# Patient Record
Sex: FEMALE | Race: WHITE | Employment: FULL TIME | URBAN - METROPOLITAN AREA
[De-identification: names, ages, dates, MRNs, and addresses within clinical notes are randomized per-mention and may not be internally consistent; named-entity substitution may affect disease eponyms.]

---

## 2017-01-31 ENCOUNTER — OFFICE VISIT (OUTPATIENT)
Dept: FAMILY MEDICINE CLINIC | Facility: CLINIC | Age: 42
End: 2017-01-31

## 2017-01-31 VITALS
RESPIRATION RATE: 16 BRPM | WEIGHT: 104 LBS | DIASTOLIC BLOOD PRESSURE: 68 MMHG | TEMPERATURE: 98 F | HEART RATE: 72 BPM | BODY MASS INDEX: 18 KG/M2 | OXYGEN SATURATION: 98 % | SYSTOLIC BLOOD PRESSURE: 110 MMHG

## 2017-01-31 DIAGNOSIS — Z72.0 TOBACCO USE: ICD-10-CM

## 2017-01-31 DIAGNOSIS — J20.9 ACUTE BRONCHITIS, UNSPECIFIED ORGANISM: Primary | ICD-10-CM

## 2017-01-31 DIAGNOSIS — Z20.89 EXPOSURE TO PNEUMONIA: ICD-10-CM

## 2017-01-31 PROCEDURE — 99202 OFFICE O/P NEW SF 15 MIN: CPT | Performed by: NURSE PRACTITIONER

## 2017-01-31 RX ORDER — AZITHROMYCIN 250 MG/1
TABLET, FILM COATED ORAL
Qty: 6 TABLET | Refills: 0 | Status: SHIPPED | OUTPATIENT
Start: 2017-01-31 | End: 2017-02-22 | Stop reason: ALTCHOICE

## 2017-01-31 RX ORDER — CODEINE PHOSPHATE AND GUAIFENESIN 10; 100 MG/5ML; MG/5ML
SOLUTION ORAL
Qty: 100 ML | Refills: 0 | Status: SHIPPED | OUTPATIENT
Start: 2017-01-31 | End: 2017-02-22 | Stop reason: ALTCHOICE

## 2017-01-31 NOTE — PATIENT INSTRUCTIONS
Bronchitis, Antibiotic Treatment (Adult)    Bronchitis is an infection of the air passages (bronchial tubes) in your lungs. It often occurs when you have a cold.  This illness is contagious during the first few days and is spread through the air by coughi · Finish all antibiotic medicine. Do this even if you are feeling better after only a few days. Follow-up care  Follow up with your healthcare provider, or as advised. If you had an X-ray or ECG (electrocardiogram), a specialist will review it.  You will b

## 2017-01-31 NOTE — PROGRESS NOTES
CHIEF COMPLAINT:   Patient presents with:  URI: Symptoms over 1 week      HPI:   Shanna Francis is a 38year old female who presents for upper respiratory symptoms for nearly 10 days now (over 1 week).  Patient reports productive cough, chest congestion, nasal FRAGMENTING OF KIDNEY STONE Right 2/26/2016    Comment Procedure: LITHOTRIPSY WITH CYSTOSCOPY, STENT PLACEMENT;  Surgeon: Brad James MD;  Location: [address] [place] [address]    CYSTOURETHROSCOPY Right 2/26/2016    Comment Procedure: LITHOTRIPSY WITH CYST ASSESSMENT: Acute bronchitis, unspecified organism  (primary encounter diagnosis)  Tobacco use  Exposure to pneumonia    PLAN:   - Meds as below. - Advised smoking cessation.  - Comfort care as described in Patient Instructions.   - Advised F/U visit if · You may use over-the-counter medicines to control fever or pain, unless another medicine was prescribed.  (Note: If you have chronic liver or kidney disease or have ever had a stomach ulcer or gastrointestinal bleeding, talk with your healthcare provider · Worsening weakness, drowsiness, headache, or stiff neck  · Trouble breathing, wheezing, or pain with breathing  Date Last Reviewed: 9/13/2015  © 4058-2235 The [address] [place] [address], [address] [place] [address]. All rights reserved.  This inf

## 2017-02-20 PROCEDURE — 82507 ASSAY OF CITRATE: CPT | Performed by: PHYSICIAN ASSISTANT

## 2017-02-20 PROCEDURE — 82436 ASSAY OF URINE CHLORIDE: CPT | Performed by: PHYSICIAN ASSISTANT

## 2017-02-20 PROCEDURE — 82340 ASSAY OF CALCIUM IN URINE: CPT | Performed by: PHYSICIAN ASSISTANT

## 2017-02-20 PROCEDURE — 84392 ASSAY OF URINE SULFATE: CPT | Performed by: PHYSICIAN ASSISTANT

## 2017-02-20 PROCEDURE — 83945 ASSAY OF OXALATE: CPT | Performed by: PHYSICIAN ASSISTANT

## 2017-02-22 PROBLEM — F41.9 ANXIETY AND DEPRESSION: Status: ACTIVE | Noted: 2017-02-22

## 2017-02-22 PROBLEM — F17.200 SMOKER: Status: ACTIVE | Noted: 2017-02-22

## 2017-02-22 PROBLEM — F32.A ANXIETY AND DEPRESSION: Status: ACTIVE | Noted: 2017-02-22

## 2017-02-22 PROCEDURE — 81003 URINALYSIS AUTO W/O SCOPE: CPT | Performed by: INTERNAL MEDICINE

## 2018-02-06 ENCOUNTER — NURSE ONLY (OUTPATIENT)
Dept: FAMILY MEDICINE CLINIC | Facility: CLINIC | Age: 43
End: 2018-02-06

## 2018-02-06 VITALS
OXYGEN SATURATION: 97 % | RESPIRATION RATE: 20 BRPM | TEMPERATURE: 98 F | HEART RATE: 80 BPM | DIASTOLIC BLOOD PRESSURE: 60 MMHG | SYSTOLIC BLOOD PRESSURE: 98 MMHG | BODY MASS INDEX: 18.07 KG/M2 | HEIGHT: 63 IN | WEIGHT: 102 LBS

## 2018-02-06 DIAGNOSIS — J02.9 PHARYNGITIS, UNSPECIFIED ETIOLOGY: Primary | ICD-10-CM

## 2018-02-06 DIAGNOSIS — J40 BRONCHITIS: ICD-10-CM

## 2018-02-06 LAB
CONTROL LINE PRESENT WITH A CLEAR BACKGROUND (YES/NO): YES YES/NO
STREP GRP A CUL-SCR: NEGATIVE

## 2018-02-06 PROCEDURE — 87880 STREP A ASSAY W/OPTIC: CPT | Performed by: NURSE PRACTITIONER

## 2018-02-06 PROCEDURE — 99213 OFFICE O/P EST LOW 20 MIN: CPT | Performed by: NURSE PRACTITIONER

## 2018-02-06 RX ORDER — ALBUTEROL SULFATE 90 UG/1
AEROSOL, METERED RESPIRATORY (INHALATION)
Qty: 1 INHALER | Refills: 0 | Status: SHIPPED | OUTPATIENT
Start: 2018-02-06 | End: 2018-03-13 | Stop reason: ALTCHOICE

## 2018-02-06 RX ORDER — BENZONATATE 200 MG/1
200 CAPSULE ORAL 3 TIMES DAILY PRN
Qty: 20 CAPSULE | Refills: 0 | Status: SHIPPED | OUTPATIENT
Start: 2018-02-06 | End: 2018-03-13 | Stop reason: ALTCHOICE

## 2018-02-06 NOTE — PROGRESS NOTES
CHIEF COMPLAINT:   Patient presents with:  Sore Throat  Cough        HPI:   Mickie Dunbar is a 41year old female who presents for cough for  2  days. Cough started gradually and is described as dry and hacking. Patient reports history of bronchitis.  Cough i Denies chest pain or palpitations  LUNGS: Per HPI. GI: Denies N/V/C/D or abdominal pain.       EXAM:   BP 98/60   Pulse 80   Temp 98 °F (36.7 °C) (Oral)   Resp 20   Ht 63\"   Wt 102 lb   LMP 02/11/2014 (LMP Unknown)   SpO2 97%   BMI 18.07 kg/m²   GENERAL:

## 2018-03-13 PROBLEM — F41.1 GAD (GENERALIZED ANXIETY DISORDER): Status: ACTIVE | Noted: 2018-03-13

## 2018-03-13 PROBLEM — F33.1 MODERATE EPISODE OF RECURRENT MAJOR DEPRESSIVE DISORDER (HCC): Status: ACTIVE | Noted: 2018-03-13

## 2018-08-30 PROCEDURE — 81001 URINALYSIS AUTO W/SCOPE: CPT | Performed by: INTERNAL MEDICINE

## 2018-11-28 PROCEDURE — 87086 URINE CULTURE/COLONY COUNT: CPT | Performed by: EMERGENCY MEDICINE

## 2018-12-14 PROCEDURE — 88305 TISSUE EXAM BY PATHOLOGIST: CPT | Performed by: OBSTETRICS & GYNECOLOGY

## 2020-10-26 ENCOUNTER — LAB ENCOUNTER (OUTPATIENT)
Dept: LAB | Facility: HOSPITAL | Age: 45
End: 2020-10-26
Attending: NURSE PRACTITIONER
Payer: COMMERCIAL

## 2020-10-26 DIAGNOSIS — F41.1 GAD (GENERALIZED ANXIETY DISORDER): ICD-10-CM

## 2020-10-26 DIAGNOSIS — F33.1 MODERATE EPISODE OF RECURRENT MAJOR DEPRESSIVE DISORDER (HCC): ICD-10-CM

## 2020-10-26 PROCEDURE — 82746 ASSAY OF FOLIC ACID SERUM: CPT

## 2020-10-26 PROCEDURE — 85025 COMPLETE CBC W/AUTO DIFF WBC: CPT

## 2020-10-26 PROCEDURE — 82607 VITAMIN B-12: CPT

## 2020-10-26 PROCEDURE — 84439 ASSAY OF FREE THYROXINE: CPT

## 2020-10-26 PROCEDURE — 36415 COLL VENOUS BLD VENIPUNCTURE: CPT

## 2020-10-26 PROCEDURE — 80053 COMPREHEN METABOLIC PANEL: CPT

## 2020-10-26 PROCEDURE — 82306 VITAMIN D 25 HYDROXY: CPT

## 2020-10-26 PROCEDURE — 84443 ASSAY THYROID STIM HORMONE: CPT

## 2021-03-03 PROCEDURE — 88360 TUMOR IMMUNOHISTOCHEM/MANUAL: CPT | Performed by: RADIOLOGY

## 2021-03-03 PROCEDURE — 88305 TISSUE EXAM BY PATHOLOGIST: CPT | Performed by: RADIOLOGY

## 2021-03-11 ENCOUNTER — NURSE NAVIGATOR ENCOUNTER (OUTPATIENT)
Dept: HEMATOLOGY/ONCOLOGY | Facility: HOSPITAL | Age: 46
End: 2021-03-11

## 2021-03-11 NOTE — PROGRESS NOTES
LMOVMTCB regarding introducing myself and the breast cancer program at [facility] phone call back from patient. 0 = independent

## 2021-03-12 ENCOUNTER — NURSE NAVIGATOR ENCOUNTER (OUTPATIENT)
Dept: HEMATOLOGY/ONCOLOGY | Facility: HOSPITAL | Age: 46
End: 2021-03-12

## 2021-03-12 NOTE — PROGRESS NOTES
Patient phoned back and we discussed newly diagnosed breast cancer. Introduced myself and explained the role of the breast nurse navigator.  Explained the role of the physicians on her breast cancer care team. Reviewed over pathology report and discussed th

## 2021-03-15 ENCOUNTER — OFFICE VISIT (OUTPATIENT)
Dept: SURGERY | Facility: CLINIC | Age: 46
End: 2021-03-15
Payer: COMMERCIAL

## 2021-03-15 VITALS
DIASTOLIC BLOOD PRESSURE: 81 MMHG | HEIGHT: 63 IN | OXYGEN SATURATION: 97 % | SYSTOLIC BLOOD PRESSURE: 150 MMHG | WEIGHT: 103 LBS | BODY MASS INDEX: 18.25 KG/M2 | TEMPERATURE: 98 F | RESPIRATION RATE: 18 BRPM | HEART RATE: 69 BPM

## 2021-03-15 DIAGNOSIS — D05.12 BREAST NEOPLASM, TIS (DCIS), LEFT: Primary | ICD-10-CM

## 2021-03-15 PROCEDURE — 3077F SYST BP >= 140 MM HG: CPT | Performed by: SURGERY

## 2021-03-15 PROCEDURE — 3079F DIAST BP 80-89 MM HG: CPT | Performed by: SURGERY

## 2021-03-15 PROCEDURE — 3008F BODY MASS INDEX DOCD: CPT | Performed by: SURGERY

## 2021-03-15 PROCEDURE — 99205 OFFICE O/P NEW HI 60 MIN: CPT | Performed by: SURGERY

## 2021-03-15 RX ORDER — MULTIVITAMIN WITH MINERALS
1 TABLET ORAL DAILY
COMMUNITY
End: 2022-01-28

## 2021-03-15 NOTE — PATIENT INSTRUCTIONS
Dr. Alex Velasco  Tel: [phone]  Fax: [facility]  [address] [place], [address] [place] [address]  [phone]     Surgery/Procedure: Bilateral nipple sparing mastectomy, left lymphoscintigraphy, left sentinel lymph node biopsy, po to arrive by and directions on where to go. They begin making calls after 2pm, if you are not contacted by 4pm, please call the surgeon's office listed above. 10. Do not take any blood thinners at least one week prior to the procedure/surgery.  This includ

## 2021-03-15 NOTE — PROGRESS NOTES
Pt educated on Pre-operative instructions by Bridgette. Given Procedure/Surgery handout. All questions answered. Encouraged to call with any further questions. Pt verbalized understanding.

## 2021-03-15 NOTE — PROGRESS NOTES
Breast Surgery New Patient Consultation    This is the first visit for this 42year old woman, referred by Dr. Kassy Murray, who presents for evaluation of L DCIS.     History of Present Illness:   Ms. Juan C Heath is a 42year old woman who presents with an imagin [place]   • LITHOTRIPSY  18    Cysto RT ESW[facility]-Dr Adams   • OTHER SURGICAL HISTORY  2018    left oopherectomy   • TUBAL LIGATION  12    with amina HYLTON       Gynecological History:  Pt is a   Pt was 24years old at time of first pregnanc nasal congestion, nose bleeds, snoring, pain in mouth/throat, hoarseness, change in voice, facial trauma.     Respiratory:  The patient denies chronic cough, phlegm, hemoptysis, pleurisy/chest pain, pneumonia, asthma, wheezing, difficulty in breathing with There is no history of poor/slow wound healing, weight loss/gain, fertility or hormone problems, cold intolerance, thyroid disease. Allergic/Immunologic:  There is no history of hives, hay fever, angioedema or anaphylaxis.     /81 (BP Location: vertebral column tenderness. Skin: The skin appears normal. There are no suspicious appearing rashes or lesions. Extremities: The extremities are without deformity, cyanosis or edema.     Impression:   Ms. Cait Cardenas is a 42year old woman presents wi affect her surgical decision making were discussed at length with the patient.   She will meet with plastic surgery to discuss her reconstruction and is leaning towards a bilateral nipple sparing mastectomy with sentinel lymph node biopsy on the left and po

## 2021-03-17 ENCOUNTER — TELEPHONE (OUTPATIENT)
Dept: SURGERY | Facility: CLINIC | Age: 46
End: 2021-03-17

## 2021-03-17 DIAGNOSIS — D05.12 DUCTAL CARCINOMA IN SITU (DCIS) OF LEFT BREAST: Primary | ICD-10-CM

## 2021-03-17 NOTE — TELEPHONE ENCOUNTER
I called the patient and scheduled her surgery with Dr Florecita Pierce on 04/12/2021 at [facility]. Patient understands procedure can change once she needs Dr Sj Pierce on 03/19/21.  Confirmed date and location

## 2021-03-18 ENCOUNTER — NURSE NAVIGATOR ENCOUNTER (OUTPATIENT)
Dept: HEMATOLOGY/ONCOLOGY | Facility: HOSPITAL | Age: 46
End: 2021-03-18

## 2021-03-18 ENCOUNTER — SOCIAL WORK SERVICES (OUTPATIENT)
Dept: HEMATOLOGY/ONCOLOGY | Facility: HOSPITAL | Age: 46
End: 2021-03-18

## 2021-03-18 NOTE — PROGRESS NOTES
Patient called and asked for resources for finances and prosthesis for post-mastectomy surgery. Resources given by telephone and via [address], as requested by patient.      Pt was provided with breast nurse navigator contact information and was encouraged to

## 2021-03-18 NOTE — PROGRESS NOTES
SW spoke with patient to discuss support resources such as [facility]. SW sent information on support centers and [employer] application to patients email.

## 2021-03-19 ENCOUNTER — GENETICS ENCOUNTER (OUTPATIENT)
Dept: GENETICS | Facility: HOSPITAL | Age: 46
End: 2021-03-19
Payer: COMMERCIAL

## 2021-03-19 ENCOUNTER — NURSE ONLY (OUTPATIENT)
Dept: HEMATOLOGY/ONCOLOGY | Facility: HOSPITAL | Age: 46
End: 2021-03-19
Payer: COMMERCIAL

## 2021-03-19 ENCOUNTER — OFFICE VISIT (OUTPATIENT)
Dept: SURGERY | Facility: CLINIC | Age: 46
End: 2021-03-19
Payer: COMMERCIAL

## 2021-03-19 ENCOUNTER — SOCIAL WORK SERVICES (OUTPATIENT)
Dept: HEMATOLOGY/ONCOLOGY | Facility: HOSPITAL | Age: 46
End: 2021-03-19

## 2021-03-19 VITALS
HEART RATE: 68 BPM | HEIGHT: 63.5 IN | DIASTOLIC BLOOD PRESSURE: 77 MMHG | WEIGHT: 102 LBS | OXYGEN SATURATION: 96 % | BODY MASS INDEX: 17.85 KG/M2 | SYSTOLIC BLOOD PRESSURE: 117 MMHG | RESPIRATION RATE: 16 BRPM

## 2021-03-19 DIAGNOSIS — D05.12 BREAST NEOPLASM, TIS (DCIS), LEFT: Primary | ICD-10-CM

## 2021-03-19 PROCEDURE — 99203 OFFICE O/P NEW LOW 30 MIN: CPT | Performed by: SURGERY

## 2021-03-19 PROCEDURE — 3074F SYST BP LT 130 MM HG: CPT | Performed by: SURGERY

## 2021-03-19 PROCEDURE — 3008F BODY MASS INDEX DOCD: CPT | Performed by: SURGERY

## 2021-03-19 PROCEDURE — 96040 HC GENETIC COUNSELING EA 30 MIN: CPT

## 2021-03-19 PROCEDURE — 36415 COLL VENOUS BLD VENIPUNCTURE: CPT

## 2021-03-19 PROCEDURE — 3078F DIAST BP <80 MM HG: CPT | Performed by: SURGERY

## 2021-03-19 NOTE — CONSULTS
New Patient Consultation    This is the first visit for this 42year old female who presents to discuss reconstructive options following mastectomy. History of Present Illness:    The patient is a 42year old female who presents with a left DCIS found by HISTORY  2018    left oopherectomy   • TUBAL LIGATION  11/9/12    with amina HYLTON         Medications:    clonazePAM (KLONOPIN) 0.5 MG Oral Tab, Take one tablet after dinner., Disp: 30 tablet, Rfl: 0  busPIRone HCl 10 MG Oral Tab, Take one tablet in m fever, angioedema or anaphylaxis. HEENT:  The patient denies ear pain, ear drainage, hearing loss, change in vision, double vision, cataracts, glaucoma, nasal congestion, nosebleed, hoarseness, sore throat, or swollen glands.     Respiratory:  The patien bipolar disorder, sleep disturbance, +anxiety, depression or feeling of despair.        Physical Exam:    /77 (BP Location: Right arm, Patient Position: Sitting, Cuff Size: adult)   Pulse 68   Resp 16   Ht 1.613 m (5' 3.5\")   Wt 46.3 kg (102 lb)   LM we discussed approaches to mastectomy. We will plan for nipple sparing mastectomy via an inframammary fold approach.   We also discussed the impact of recent tobacco use and mastectomy skin flap perfusion and discuss elevated risk of mastectomy skin flap n radiation therapy on implant-based reconstruction (should it be deemed necessary based on the final pathology results), including increased risk of reconstructive loss and capsular contracture.      Multiple questions were answered to the patient's satisfac

## 2021-03-19 NOTE — PROGRESS NOTES
Patient Name: Rodrigo Gray  YOB: 1975  Date of Visit: 3/19/2021    Reason for visit: Ms. Brian Ruiz was seen for the purposes of genetic counseling due to her recent diagnosis of DCIS at age 42 and a family history of prostate and other cancers.  The kailyn the family history is negative for other significant genetic conditions, cancers, or birth defects of any kind. See scanned pedigree for full family history reported during the session.     Ms. Mark Wise maternal ethnicity is [place] and her paternal ethnicit the birth of that individual.  As a result, only one other mutation is required to occur during the lifetime of this individual in order for a particular cell to have no functional tumor suppressor genes and for malignant transformation to occur.     BRCA2 mutation was identified in a cancer susceptibility gene. While testing detects gene mutations, it is possible for a mutation to be present and go undetected. It is also possible for a mutation to be located in a gene other than those being tested.      A genetic testing for high-penetrance cancer susceptibility genes is indicated based on the NCCN guidelines. Ms. Lacey Lopez appeared to understand the information presented. On the day of the visit Ms. Lacey Lopez elected to proceed with genetic testing for heredita

## 2021-03-19 NOTE — PATIENT INSTRUCTIONS
Surgeon:         Dr. Eric Winter                                        Tel:         [phone]                                  Fax:        [phone]    Surgery/Procedure: Bilateral nipple sparing mastectomy with Dr. Fercho Nagel.  Immediate breast reconst __________  Photos taken on _________

## 2021-03-22 DIAGNOSIS — D05.12 DUCTAL CARCINOMA IN SITU (DCIS) OF LEFT BREAST: Primary | ICD-10-CM

## 2021-03-23 ENCOUNTER — SOCIAL WORK SERVICES (OUTPATIENT)
Dept: HEMATOLOGY/ONCOLOGY | Facility: HOSPITAL | Age: 46
End: 2021-03-23

## 2021-03-23 NOTE — PROGRESS NOTES
Fouzia left second voice message with contact information, requesting a return call to discuss FMLA paperwork.

## 2021-03-24 RX ORDER — ACETAMINOPHEN 500 MG
1000 TABLET ORAL ONCE
Status: CANCELLED | OUTPATIENT
Start: 2021-03-24 | End: 2021-03-24

## 2021-03-25 PROBLEM — D05.12 DUCTAL CARCINOMA IN SITU (DCIS) OF LEFT BREAST: Status: ACTIVE | Noted: 2021-03-25

## 2021-03-26 ENCOUNTER — TELEPHONE (OUTPATIENT)
Dept: GENERAL RADIOLOGY | Facility: HOSPITAL | Age: 46
End: 2021-03-26

## 2021-03-29 ENCOUNTER — GENETICS ENCOUNTER (OUTPATIENT)
Dept: HEMATOLOGY/ONCOLOGY | Facility: HOSPITAL | Age: 46
End: 2021-03-29

## 2021-03-29 NOTE — PROGRESS NOTES
Patient Name: Kaushik Cruz  YOB: 1975    Referring Provider:  Lexii Delaney MD          Reason for Referral:  Ms. Maria Ines Farmer had genetic testing performed on 3/19/2021 because of a her recent diagnosis of DCIS at age 42.       Genetic Testing Resul family history. Please do not hesitate to contact my office if you have any questions or concerns, [phone].      Shea Ivory MS, CGC

## 2021-04-09 ENCOUNTER — LAB ENCOUNTER (OUTPATIENT)
Dept: LAB | Age: 46
End: 2021-04-09
Attending: SURGERY
Payer: COMMERCIAL

## 2021-04-09 DIAGNOSIS — D05.12 DUCTAL CARCINOMA IN SITU (DCIS) OF LEFT BREAST: ICD-10-CM

## 2021-04-12 ENCOUNTER — HOSPITAL ENCOUNTER (OUTPATIENT)
Facility: HOSPITAL | Age: 46
Discharge: HOME | End: 2021-04-13
Attending: SURGERY | Admitting: SURGERY
Payer: COMMERCIAL

## 2021-04-12 ENCOUNTER — ANESTHESIA (OUTPATIENT)
Dept: SURGERY | Facility: HOSPITAL | Age: 46
End: 2021-04-12
Payer: COMMERCIAL

## 2021-04-12 ENCOUNTER — ANESTHESIA EVENT (OUTPATIENT)
Dept: SURGERY | Facility: HOSPITAL | Age: 46
End: 2021-04-12
Payer: COMMERCIAL

## 2021-04-12 ENCOUNTER — HOSPITAL ENCOUNTER (OUTPATIENT)
Dept: NUCLEAR MEDICINE | Facility: HOSPITAL | Age: 46
Discharge: HOME | End: 2021-04-12
Attending: SURGERY
Payer: COMMERCIAL

## 2021-04-12 DIAGNOSIS — D05.12 DUCTAL CARCINOMA IN SITU (DCIS) OF LEFT BREAST: Primary | ICD-10-CM

## 2021-04-12 DIAGNOSIS — D05.12 DUCTAL CARCINOMA IN SITU (DCIS) OF LEFT BREAST: ICD-10-CM

## 2021-04-12 PROCEDURE — 76942 ECHO GUIDE FOR BIOPSY: CPT | Performed by: ANESTHESIOLOGY

## 2021-04-12 PROCEDURE — 88331 PATH CONSLTJ SURG 1 BLK 1SPC: CPT | Performed by: SURGERY

## 2021-04-12 PROCEDURE — 88307 TISSUE EXAM BY PATHOLOGIST: CPT | Performed by: SURGERY

## 2021-04-12 PROCEDURE — 07B60ZX EXCISION OF LEFT AXILLARY LYMPHATIC, OPEN APPROACH, DIAGNOSTIC: ICD-10-PCS | Performed by: SURGERY

## 2021-04-12 PROCEDURE — 0JQ60ZZ REPAIR CHEST SUBCUTANEOUS TISSUE AND FASCIA, OPEN APPROACH: ICD-10-PCS | Performed by: SURGERY

## 2021-04-12 PROCEDURE — 78195 LYMPH SYSTEM IMAGING: CPT | Performed by: SURGERY

## 2021-04-12 PROCEDURE — 0HTV0ZZ RESECTION OF BILATERAL BREAST, OPEN APPROACH: ICD-10-PCS | Performed by: SURGERY

## 2021-04-12 RX ORDER — DIPHENHYDRAMINE HCL 25 MG
25 CAPSULE ORAL EVERY 4 HOURS PRN
Status: DISCONTINUED | OUTPATIENT
Start: 2021-04-12 | End: 2021-04-13

## 2021-04-12 RX ORDER — ONDANSETRON 2 MG/ML
INJECTION INTRAMUSCULAR; INTRAVENOUS AS NEEDED
Status: DISCONTINUED | OUTPATIENT
Start: 2021-04-12 | End: 2021-04-12 | Stop reason: SURG

## 2021-04-12 RX ORDER — DOCUSATE SODIUM 100 MG/1
100 CAPSULE, LIQUID FILLED ORAL 2 TIMES DAILY
Status: DISCONTINUED | OUTPATIENT
Start: 2021-04-13 | End: 2021-04-13

## 2021-04-12 RX ORDER — HYDROCODONE BITARTRATE AND ACETAMINOPHEN 5; 325 MG/1; MG/1
1-2 TABLET ORAL EVERY 6 HOURS PRN
Qty: 40 TABLET | Refills: 0 | Status: SHIPPED | OUTPATIENT
Start: 2021-04-12 | End: 2021-05-04 | Stop reason: ALTCHOICE

## 2021-04-12 RX ORDER — MORPHINE SULFATE 2 MG/ML
2 INJECTION, SOLUTION INTRAMUSCULAR; INTRAVENOUS EVERY 2 HOUR PRN
Status: DISCONTINUED | OUTPATIENT
Start: 2021-04-12 | End: 2021-04-13

## 2021-04-12 RX ORDER — INDOCYANINE GREEN AND WATER 25 MG
KIT INJECTION AS NEEDED
Status: DISCONTINUED | OUTPATIENT
Start: 2021-04-12 | End: 2021-04-12 | Stop reason: SURG

## 2021-04-12 RX ORDER — METOCLOPRAMIDE HYDROCHLORIDE 5 MG/ML
INJECTION INTRAMUSCULAR; INTRAVENOUS AS NEEDED
Status: DISCONTINUED | OUTPATIENT
Start: 2021-04-12 | End: 2021-04-12 | Stop reason: SURG

## 2021-04-12 RX ORDER — MORPHINE SULFATE 4 MG/ML
6 INJECTION, SOLUTION INTRAMUSCULAR; INTRAVENOUS EVERY 2 HOUR PRN
Status: DISCONTINUED | OUTPATIENT
Start: 2021-04-12 | End: 2021-04-13

## 2021-04-12 RX ORDER — DEXAMETHASONE SODIUM PHOSPHATE 10 MG/ML
INJECTION, SOLUTION INTRAMUSCULAR; INTRAVENOUS AS NEEDED
Status: DISCONTINUED | OUTPATIENT
Start: 2021-04-12 | End: 2021-04-12 | Stop reason: SURG

## 2021-04-12 RX ORDER — ENOXAPARIN SODIUM 100 MG/ML
40 INJECTION SUBCUTANEOUS DAILY
Status: DISCONTINUED | OUTPATIENT
Start: 2021-04-13 | End: 2021-04-13

## 2021-04-12 RX ORDER — DOCUSATE SODIUM 100 MG/1
100 CAPSULE, LIQUID FILLED ORAL 2 TIMES DAILY
Qty: 40 CAPSULE | Refills: 0 | Status: SHIPPED | OUTPATIENT
Start: 2021-04-12 | End: 2021-05-04 | Stop reason: ALTCHOICE

## 2021-04-12 RX ORDER — NEOSTIGMINE METHYLSULFATE 1 MG/ML
INJECTION INTRAVENOUS AS NEEDED
Status: DISCONTINUED | OUTPATIENT
Start: 2021-04-12 | End: 2021-04-12 | Stop reason: SURG

## 2021-04-12 RX ORDER — ALPRAZOLAM 0.25 MG/1
0.25 TABLET ORAL DAILY PRN
Status: DISCONTINUED | OUTPATIENT
Start: 2021-04-12 | End: 2021-04-13

## 2021-04-12 RX ORDER — HYDROMORPHONE HYDROCHLORIDE 1 MG/ML
INJECTION, SOLUTION INTRAMUSCULAR; INTRAVENOUS; SUBCUTANEOUS
Status: COMPLETED
Start: 2021-04-12 | End: 2021-04-12

## 2021-04-12 RX ORDER — SODIUM CHLORIDE, SODIUM LACTATE, POTASSIUM CHLORIDE, CALCIUM CHLORIDE 600; 310; 30; 20 MG/100ML; MG/100ML; MG/100ML; MG/100ML
INJECTION, SOLUTION INTRAVENOUS CONTINUOUS
Status: DISCONTINUED | OUTPATIENT
Start: 2021-04-12 | End: 2021-04-12 | Stop reason: HOSPADM

## 2021-04-12 RX ORDER — HYDROMORPHONE HYDROCHLORIDE 1 MG/ML
0.4 INJECTION, SOLUTION INTRAMUSCULAR; INTRAVENOUS; SUBCUTANEOUS EVERY 5 MIN PRN
Status: DISCONTINUED | OUTPATIENT
Start: 2021-04-12 | End: 2021-04-12 | Stop reason: HOSPADM

## 2021-04-12 RX ORDER — METOCLOPRAMIDE 10 MG/1
10 TABLET ORAL EVERY 6 HOURS PRN
Status: DISCONTINUED | OUTPATIENT
Start: 2021-04-12 | End: 2021-04-13

## 2021-04-12 RX ORDER — ONDANSETRON 4 MG/1
4 TABLET, ORALLY DISINTEGRATING ORAL EVERY 6 HOURS PRN
Status: DISCONTINUED | OUTPATIENT
Start: 2021-04-12 | End: 2021-04-13

## 2021-04-12 RX ORDER — SODIUM CHLORIDE, SODIUM LACTATE, POTASSIUM CHLORIDE, CALCIUM CHLORIDE 600; 310; 30; 20 MG/100ML; MG/100ML; MG/100ML; MG/100ML
INJECTION, SOLUTION INTRAVENOUS CONTINUOUS
Status: DISCONTINUED | OUTPATIENT
Start: 2021-04-12 | End: 2021-04-13

## 2021-04-12 RX ORDER — ESCITALOPRAM OXALATE 20 MG/1
20 TABLET ORAL EVERY EVENING
Status: DISCONTINUED | OUTPATIENT
Start: 2021-04-12 | End: 2021-04-13

## 2021-04-12 RX ORDER — HYDROCODONE BITARTRATE AND ACETAMINOPHEN 5; 325 MG/1; MG/1
2 TABLET ORAL AS NEEDED
Status: DISCONTINUED | OUTPATIENT
Start: 2021-04-12 | End: 2021-04-12 | Stop reason: HOSPADM

## 2021-04-12 RX ORDER — HYDROCODONE BITARTRATE AND ACETAMINOPHEN 5; 325 MG/1; MG/1
1 TABLET ORAL EVERY 6 HOURS PRN
Status: DISCONTINUED | OUTPATIENT
Start: 2021-04-12 | End: 2021-04-13

## 2021-04-12 RX ORDER — CEFAZOLIN SODIUM/WATER 2 G/20 ML
2 SYRINGE (ML) INTRAVENOUS ONCE
Status: COMPLETED | OUTPATIENT
Start: 2021-04-12 | End: 2021-04-12

## 2021-04-12 RX ORDER — ONDANSETRON 4 MG/1
4 TABLET, FILM COATED ORAL EVERY 8 HOURS PRN
Qty: 20 TABLET | Refills: 1 | Status: SHIPPED | OUTPATIENT
Start: 2021-04-12 | End: 2021-04-22 | Stop reason: ALTCHOICE

## 2021-04-12 RX ORDER — NALOXONE HYDROCHLORIDE 0.4 MG/ML
80 INJECTION, SOLUTION INTRAMUSCULAR; INTRAVENOUS; SUBCUTANEOUS AS NEEDED
Status: DISCONTINUED | OUTPATIENT
Start: 2021-04-12 | End: 2021-04-12 | Stop reason: HOSPADM

## 2021-04-12 RX ORDER — KETAMINE HYDROCHLORIDE 50 MG/ML
INJECTION, SOLUTION, CONCENTRATE INTRAMUSCULAR; INTRAVENOUS AS NEEDED
Status: DISCONTINUED | OUTPATIENT
Start: 2021-04-12 | End: 2021-04-12 | Stop reason: SURG

## 2021-04-12 RX ORDER — ACETAMINOPHEN 325 MG/1
650 TABLET ORAL EVERY 6 HOURS PRN
Status: DISCONTINUED | OUTPATIENT
Start: 2021-04-12 | End: 2021-04-13

## 2021-04-12 RX ORDER — ROCURONIUM BROMIDE 10 MG/ML
INJECTION, SOLUTION INTRAVENOUS AS NEEDED
Status: DISCONTINUED | OUTPATIENT
Start: 2021-04-12 | End: 2021-04-12 | Stop reason: SURG

## 2021-04-12 RX ORDER — CEFAZOLIN SODIUM/WATER 2 G/20 ML
2 SYRINGE (ML) INTRAVENOUS EVERY 8 HOURS
Status: COMPLETED | OUTPATIENT
Start: 2021-04-12 | End: 2021-04-13

## 2021-04-12 RX ORDER — DOCUSATE SODIUM 100 MG/1
100 CAPSULE, LIQUID FILLED ORAL 2 TIMES DAILY
Status: CANCELLED | OUTPATIENT
Start: 2021-04-13

## 2021-04-12 RX ORDER — DIAZEPAM 5 MG/1
5 TABLET ORAL AS NEEDED
Status: DISCONTINUED | OUTPATIENT
Start: 2021-04-12 | End: 2021-04-12 | Stop reason: HOSPADM

## 2021-04-12 RX ORDER — MIDAZOLAM HYDROCHLORIDE 1 MG/ML
1 INJECTION INTRAMUSCULAR; INTRAVENOUS EVERY 5 MIN PRN
Status: DISCONTINUED | OUTPATIENT
Start: 2021-04-12 | End: 2021-04-12 | Stop reason: HOSPADM

## 2021-04-12 RX ORDER — CEPHALEXIN 500 MG/1
500 CAPSULE ORAL 4 TIMES DAILY
Qty: 40 CAPSULE | Refills: 2 | Status: SHIPPED | OUTPATIENT
Start: 2021-04-12 | End: 2021-04-12

## 2021-04-12 RX ORDER — ONDANSETRON 2 MG/ML
4 INJECTION INTRAMUSCULAR; INTRAVENOUS EVERY 6 HOURS PRN
Status: DISCONTINUED | OUTPATIENT
Start: 2021-04-12 | End: 2021-04-13

## 2021-04-12 RX ORDER — METOCLOPRAMIDE HYDROCHLORIDE 5 MG/ML
10 INJECTION INTRAMUSCULAR; INTRAVENOUS EVERY 6 HOURS PRN
Status: DISCONTINUED | OUTPATIENT
Start: 2021-04-12 | End: 2021-04-13

## 2021-04-12 RX ORDER — ESCITALOPRAM OXALATE 20 MG/1
20 TABLET ORAL EVERY MORNING
Status: DISCONTINUED | OUTPATIENT
Start: 2021-04-12 | End: 2021-04-12

## 2021-04-12 RX ORDER — BUPIVACAINE HYDROCHLORIDE 2.5 MG/ML
INJECTION, SOLUTION EPIDURAL; INFILTRATION; INTRACAUDAL AS NEEDED
Status: DISCONTINUED | OUTPATIENT
Start: 2021-04-12 | End: 2021-04-12 | Stop reason: SURG

## 2021-04-12 RX ORDER — HYDROCODONE BITARTRATE AND ACETAMINOPHEN 5; 325 MG/1; MG/1
2 TABLET ORAL EVERY 6 HOURS PRN
Status: DISCONTINUED | OUTPATIENT
Start: 2021-04-12 | End: 2021-04-13

## 2021-04-12 RX ORDER — HYDROCODONE BITARTRATE AND ACETAMINOPHEN 5; 325 MG/1; MG/1
1 TABLET ORAL AS NEEDED
Status: DISCONTINUED | OUTPATIENT
Start: 2021-04-12 | End: 2021-04-12 | Stop reason: HOSPADM

## 2021-04-12 RX ORDER — LIDOCAINE AND PRILOCAINE 25; 25 MG/G; MG/G
CREAM TOPICAL ONCE
Status: COMPLETED | OUTPATIENT
Start: 2021-04-12 | End: 2021-04-12

## 2021-04-12 RX ORDER — GLYCOPYRROLATE 0.2 MG/ML
INJECTION, SOLUTION INTRAMUSCULAR; INTRAVENOUS AS NEEDED
Status: DISCONTINUED | OUTPATIENT
Start: 2021-04-12 | End: 2021-04-12 | Stop reason: SURG

## 2021-04-12 RX ORDER — ACETAMINOPHEN 500 MG
1000 TABLET ORAL ONCE AS NEEDED
Status: DISCONTINUED | OUTPATIENT
Start: 2021-04-12 | End: 2021-04-12 | Stop reason: HOSPADM

## 2021-04-12 RX ORDER — DIPHENHYDRAMINE HYDROCHLORIDE 50 MG/ML
12.5 INJECTION INTRAMUSCULAR; INTRAVENOUS EVERY 4 HOURS PRN
Status: DISCONTINUED | OUTPATIENT
Start: 2021-04-12 | End: 2021-04-13

## 2021-04-12 RX ORDER — MEPERIDINE HYDROCHLORIDE 25 MG/ML
12.5 INJECTION INTRAMUSCULAR; INTRAVENOUS; SUBCUTANEOUS AS NEEDED
Status: DISCONTINUED | OUTPATIENT
Start: 2021-04-12 | End: 2021-04-12 | Stop reason: HOSPADM

## 2021-04-12 RX ORDER — ONDANSETRON 2 MG/ML
4 INJECTION INTRAMUSCULAR; INTRAVENOUS AS NEEDED
Status: DISCONTINUED | OUTPATIENT
Start: 2021-04-12 | End: 2021-04-12 | Stop reason: HOSPADM

## 2021-04-12 RX ORDER — PANTOPRAZOLE SODIUM 40 MG/1
40 TABLET, DELAYED RELEASE ORAL
Status: DISCONTINUED | OUTPATIENT
Start: 2021-04-13 | End: 2021-04-13

## 2021-04-12 RX ORDER — DEXAMETHASONE SODIUM PHOSPHATE 4 MG/ML
VIAL (ML) INJECTION AS NEEDED
Status: DISCONTINUED | OUTPATIENT
Start: 2021-04-12 | End: 2021-04-12 | Stop reason: SURG

## 2021-04-12 RX ORDER — LIDOCAINE HYDROCHLORIDE 10 MG/ML
INJECTION, SOLUTION EPIDURAL; INFILTRATION; INTRACAUDAL; PERINEURAL AS NEEDED
Status: DISCONTINUED | OUTPATIENT
Start: 2021-04-12 | End: 2021-04-12 | Stop reason: SURG

## 2021-04-12 RX ORDER — MORPHINE SULFATE 4 MG/ML
4 INJECTION, SOLUTION INTRAMUSCULAR; INTRAVENOUS EVERY 2 HOUR PRN
Status: DISCONTINUED | OUTPATIENT
Start: 2021-04-12 | End: 2021-04-13

## 2021-04-12 RX ORDER — BUSPIRONE HYDROCHLORIDE 10 MG/1
10 TABLET ORAL 2 TIMES DAILY
COMMUNITY
End: 2021-04-20 | Stop reason: ALTCHOICE

## 2021-04-12 RX ORDER — MIDAZOLAM HYDROCHLORIDE 1 MG/ML
INJECTION INTRAMUSCULAR; INTRAVENOUS AS NEEDED
Status: DISCONTINUED | OUTPATIENT
Start: 2021-04-12 | End: 2021-04-12 | Stop reason: SURG

## 2021-04-12 RX ADMIN — LIDOCAINE HYDROCHLORIDE 50 MG: 10 INJECTION, SOLUTION EPIDURAL; INFILTRATION; INTRACAUDAL; PERINEURAL at 11:07:00

## 2021-04-12 RX ADMIN — DEXAMETHASONE SODIUM PHOSPHATE 8 MG: 4 MG/ML VIAL (ML) INJECTION at 11:16:00

## 2021-04-12 RX ADMIN — ONDANSETRON 4 MG: 2 INJECTION INTRAMUSCULAR; INTRAVENOUS at 13:09:00

## 2021-04-12 RX ADMIN — GLYCOPYRROLATE 0.2 MG: 0.2 INJECTION, SOLUTION INTRAMUSCULAR; INTRAVENOUS at 11:39:00

## 2021-04-12 RX ADMIN — DEXAMETHASONE SODIUM PHOSPHATE 4 MG: 10 INJECTION, SOLUTION INTRAMUSCULAR; INTRAVENOUS at 11:15:00

## 2021-04-12 RX ADMIN — INDOCYANINE GREEN AND WATER 7.5 MG: 25 MG KIT INJECTION at 13:05:00

## 2021-04-12 RX ADMIN — GLYCOPYRROLATE 0.4 MG: 0.2 INJECTION, SOLUTION INTRAMUSCULAR; INTRAVENOUS at 13:31:00

## 2021-04-12 RX ADMIN — ROCURONIUM BROMIDE 30 MG: 10 INJECTION, SOLUTION INTRAVENOUS at 11:54:00

## 2021-04-12 RX ADMIN — ROCURONIUM BROMIDE 50 MG: 10 INJECTION, SOLUTION INTRAVENOUS at 11:07:00

## 2021-04-12 RX ADMIN — CEFAZOLIN SODIUM/WATER 2 G: 2 G/20 ML SYRINGE (ML) INTRAVENOUS at 11:12:00

## 2021-04-12 RX ADMIN — SODIUM CHLORIDE, SODIUM LACTATE, POTASSIUM CHLORIDE, CALCIUM CHLORIDE: 600; 310; 30; 20 INJECTION, SOLUTION INTRAVENOUS at 11:04:00

## 2021-04-12 RX ADMIN — MIDAZOLAM HYDROCHLORIDE 2 MG: 1 INJECTION INTRAMUSCULAR; INTRAVENOUS at 11:04:00

## 2021-04-12 RX ADMIN — SODIUM CHLORIDE, SODIUM LACTATE, POTASSIUM CHLORIDE, CALCIUM CHLORIDE: 600; 310; 30; 20 INJECTION, SOLUTION INTRAVENOUS at 13:34:00

## 2021-04-12 RX ADMIN — NEOSTIGMINE METHYLSULFATE 3 MG: 1 INJECTION INTRAVENOUS at 13:31:00

## 2021-04-12 RX ADMIN — KETAMINE HYDROCHLORIDE 50 MG: 50 INJECTION, SOLUTION, CONCENTRATE INTRAMUSCULAR; INTRAVENOUS at 11:32:00

## 2021-04-12 RX ADMIN — SODIUM CHLORIDE, SODIUM LACTATE, POTASSIUM CHLORIDE, CALCIUM CHLORIDE: 600; 310; 30; 20 INJECTION, SOLUTION INTRAVENOUS at 12:23:00

## 2021-04-12 RX ADMIN — METOCLOPRAMIDE HYDROCHLORIDE 10 MG: 5 INJECTION INTRAMUSCULAR; INTRAVENOUS at 11:16:00

## 2021-04-12 RX ADMIN — BUPIVACAINE HYDROCHLORIDE 50 ML: 2.5 INJECTION, SOLUTION EPIDURAL; INFILTRATION; INTRACAUDAL at 11:15:00

## 2021-04-12 NOTE — BRIEF OP NOTE
Pre-Operative Diagnosis: Ductal carcinoma in situ (DCIS) of left breast [D05.12]     Post-Operative Diagnosis: Ductal carcinoma in situ (DCIS) of left breast [D05.12]      Procedure Performed:   Bilateral nipple sparing mastectomy, left lymphoscintigraphy,

## 2021-04-12 NOTE — ANESTHESIA PREPROCEDURE EVALUATION
PRE-OP EVALUATION    Patient Name: Evon Wise    Admit Diagnosis: Ductal carcinoma in situ (DCIS) of left breast [D05.12]    Pre-op Diagnosis: Ductal carcinoma in situ (DCIS) of left breast [D05.12]    Bilateral nipple sparing mastectomy, left lymphoscinti Reported on 3/25/2021 ), Disp: 9 tablet, Rfl: 1        Allergies: Blue Dyes      Anesthesia Evaluation    Patient summary reviewed.     Anesthetic Complications  (-) history of anesthetic complications         GI/Hepatic/Renal    Negative GI/hepatic/renal R HGB 13.1 03/25/2021    HCT 39.8 03/25/2021    MCV 94.5 03/25/2021    MCH 31.1 03/25/2021    MCHC 32.9 03/25/2021    RDW 12.5 03/25/2021     03/25/2021     Lab Results   Component Value Date     03/25/2021    K 4.13 03/25/2021     03/2

## 2021-04-12 NOTE — OPERATIVE REPORT
[facility] ([facility]    PATIENT'S NAME: RENEE FARNSWORTH   ATTENDING PHYSICIAN: Rebekah Molina. Radha Plunkett M.D. OPERATING PHYSICIAN: Alex Gonzalez M.D.    PATIENT ACCOUNT#:   [de-identified]    LOCATION:  [address] [place] [address]  MEDICAL RECORD #:   [id number]       DATE OF BIRTH: entered the room and the plastic surgery part of the procedure began. The flaps were inspected. They appeared to be of sufficient thickness and viability to facilitate reconstruction.   However, given the patient's a history of chronic tobacco use, an Asc

## 2021-04-12 NOTE — CONSULTS
[facility] Patient Status:  Observation    1975 MRN [id number]   [facility] 3NW-A Attending Aretha De La Fuente MD   Hosp Day # 0 PCP Hailey Rodriguez MD     Chief Complaint: Ductal Carcinoma in Situ of 11/9/12    with amina HYLTON       Social History:  reports that she quit smoking about 4 weeks ago. She has a 10.00 pack-year smoking history. She has never used smokeless tobacco. She reports current alcohol use.  She reports that she does not use drug (1.613 m)   Wt 105 lb 13.1 oz (48 kg)   LMP 02/11/2014 (LMP Unknown)   SpO2 97%   BMI 18.45 kg/m²   General: No acute distress. Alert and oriented x 3. HEENT: Normocephalic atraumatic. Moist mucous membranes. EOM-I. Neck: No JVD. No carotid bruits.   Resp Anxiety  -lexapro    GI prophylaxis  -pantoprazole     Quality:  · DVT Prophylaxis: lovenox    Plan of care discussed with patient and staff    Dispo: no discharge    Bud Patterson MD  [facility] Hospitalist  [phone]

## 2021-04-12 NOTE — PROGRESS NOTES
Plan for bilateral NS-mastectomy by Dr. Tawana Nichole and immediate reconstruction with tissue expander and acellular dermal matrix reviewed with patient.  We discussed the need intra-operative assessment of mastectomy skin flap perfusion given her recent smoking

## 2021-04-12 NOTE — OPERATIVE REPORT
[address] [place]    PATIENT'S NAME: RENEE FARNSWORTH   ATTENDING PHYSICIAN: Malena Ulloa M.D. OPERATING PHYSICIAN: Malena Ulloa M.D.    PATIENT ACCOUNT#:   [de-identified]    LOCATION:  [address] [place] [address]  MEDICAL RECORD #:   [id number]       DATE OF BIR infection, bleeding, injury to surrounding structures, possible need for reoperation. She agreed to the proposed surgery.      OPERATIVE TECHNIQUE:  The patient underwent injection of radioisotope as well as lymphoscintigraphy for sentinel lymph node ident A subareolar biopsy was taken on the left side, sent for frozen section with a clip denoting the true margin. The results were negative for the presence of atypical cells, and therefore, we proceeded with a nipple-sparing approach.   A short stitch was aly Anitra Paige

## 2021-04-12 NOTE — ANESTHESIA PROCEDURE NOTES
Airway  Urgency: elective    Airway not difficult    General Information and Staff    Patient location during procedure: OR  Anesthesiologist: Solitario Levine MD  Resident/CRNA: Cooper Muñoz CRNA  Performed: CRNA     Indications and Patient Condition  Indica

## 2021-04-12 NOTE — H&P
History of Present Illness:   Ms. Anita Rodriguez is a 42year old woman who presents with an imaging detected concern of the left breast. She denies any palpable masses, nipple discharge, skin changes or axillary symptoms.  She does not have a significant famil CONCETTA     Gynecological History:   Pt is a    Pt was 24years old at time of first pregnancy. She denies any cumulative breastfeeding history .    She achieved menarche at age 17   Age of Menopause: 42 Type: Hysterectomy with ovaries left in   She alexandr wheezing, difficulty in breathing with exertion, emphysema, chronic bronchitis, shortness of breath or abnormal sound when breathing. Cardiovascular:    There is no history of chest pain, chest pressure/discomfort, palpitations, irregular heartbeat, faint 150/81 (BP Location: Right arm, Patient Position: Sitting, Cuff Size: adult)  Pulse 69  Temp 98.1 °F (36.7 °C) (Oral)  Resp 18  Ht 1.6 m (5' 3\")  Wt 46.7 kg (103 lb)  LMP 02/11/2014 (LMP Unknown)  SpO2 97%  BMI 18.25 kg/m²   Physical Exam:   The patient i imaging detected left breast DCIS, clinical stage Tis NX MX. Discussion and Plan:   I had a discussion with the Patient regarding her breast exam. On exam today I found her to be healing well since her biopsy with no other clinical findings.  Personally manda left axillary lymph node dissection. I advised her to quit smoking immediately so as to optimize her recovery from surgery.  The risks and possible complications of the procedure were explained to the patient and her family and she understood and agreed to

## 2021-04-12 NOTE — PLAN OF CARE
Problem: Patient/Family Goals  Goal: Patient/Family Long Term Goal  Description: Patient's Long Term Goal: go home and have expanders place in near future    Interventions:  - Follow POC/Interventions  - See additional Care Plan goals for specific interv bristle tooth brush  - Limit straining and forceful nose blowing  Outcome: Progressing

## 2021-04-12 NOTE — ANESTHESIA POSTPROCEDURE EVALUATION
[address] [facility] Patient Status:  Outpatient in a Bed   Age/Gender 42year old female MRN [id number]   Location [address] [facility] Attending Lev Swain MD   Hosp Day # 0 PCP Umair Castaneda MD       Anesthesia Post-op Note    Bilateral ni

## 2021-04-12 NOTE — PLAN OF CARE
NURSING ADMISSION NOTE      Patient admitted via bed from PACU  Oriented to room. Safety precautions initiated. Bed in low position. Call light in reach. Pt is A/Ox4. VSS, afebrile.  Pain rated 2/10 to left breast, declines needing pain med curren

## 2021-04-12 NOTE — ANESTHESIA PROCEDURE NOTES
[facility]  Performed by: Beau Ponce CRNA  Authorized by: Shanna Becker MD       General Information and Staff    Start Time:   Anesthesiologist: Shanna Becker MD  CRNA:  Beau Ponce CRNA  Performed by:  CRNA  Patient Location:  OR    [facility]

## 2021-04-13 ENCOUNTER — NURSE NAVIGATOR ENCOUNTER (OUTPATIENT)
Dept: HEMATOLOGY/ONCOLOGY | Facility: HOSPITAL | Age: 46
End: 2021-04-13

## 2021-04-13 VITALS
WEIGHT: 105.81 LBS | TEMPERATURE: 98 F | OXYGEN SATURATION: 96 % | HEART RATE: 64 BPM | SYSTOLIC BLOOD PRESSURE: 111 MMHG | HEIGHT: 63.5 IN | RESPIRATION RATE: 18 BRPM | DIASTOLIC BLOOD PRESSURE: 51 MMHG | BODY MASS INDEX: 18.51 KG/M2

## 2021-04-13 PROCEDURE — C9113 INJ PANTOPRAZOLE SODIUM, VIA: HCPCS | Performed by: PHYSICIAN ASSISTANT

## 2021-04-13 RX ORDER — HYDROCODONE BITARTRATE AND ACETAMINOPHEN 5; 325 MG/1; MG/1
2 TABLET ORAL EVERY 4 HOURS PRN
Status: DISCONTINUED | OUTPATIENT
Start: 2021-04-13 | End: 2021-04-13

## 2021-04-13 RX ORDER — HYDROCODONE BITARTRATE AND ACETAMINOPHEN 5; 325 MG/1; MG/1
1 TABLET ORAL EVERY 4 HOURS PRN
Status: DISCONTINUED | OUTPATIENT
Start: 2021-04-13 | End: 2021-04-13

## 2021-04-13 NOTE — PROGRESS NOTES
Met with patient following mastectomy without reconstruction. Pt is doing well, she is tolerating general diet. Up in the room without issue. We discussed lymph node procedure and lymphedema precautions.  She will also see PT/OT for lymphedema as needed, de

## 2021-04-13 NOTE — PROGRESS NOTES
[facility] Hospitalist Progress Note                                                                   [facility]      Elizabeth Orta  1/27/1975    SUBJECTIVE: no chest pain, palpitations, shortness of breath, cough PLAN:   42year old female with history of ADHD, anxiety and depression, kidney stones, vitamin d deficiency presenting with ductal carcinoma in situ of the left breast s/p bilateral nipple sparing mastectomy, left lymphoscintigraphy, left sentinel lymph n

## 2021-04-13 NOTE — PROGRESS NOTES
Plastic Surgery Progress Note    Elizabeth Jansen is a 42year old female POD#1 s/p bilateral nipple sparing mastectomies with left SLNB (Dr. Josephine Kumar) and inspection of mastectomy skin flap perfusion with [facility] green laser imaging, complex closure of bilateral b to strip drain tubing, empty and record drains output (total in cc per day). 9. Follow up with Genny Barbour PA-C in 1 week. Our office will call to set up this appointment.     Arianna Bryant  4/13/2021  9:54 AM

## 2021-04-13 NOTE — PLAN OF CARE
NURSING DISCHARGE NOTE    Discharged Home via Wheelchair. Accompanied by Support staff  Belongings Taken by patient/family. Pt verbalized understanding of discharge instructions. IV D/Cd.  Pt demonstrated proper understanding of drain care and incis

## 2021-04-13 NOTE — PLAN OF CARE
Pt axo4, resting in bed, pt has upper chest pain treated with norco pain medication. Pt's abdomen is soft non tender bowel sounds active last BM 4/11/21, pt is passing gas and belching.   Pt voiding in toilet, bilateral MAULIK drains with serosanguineous fluid

## 2021-04-13 NOTE — CM/SW NOTE
04/13/21 1000   Discharge disposition   Expected discharge disposition Home or Self   Name of [address] [facility]       MSW met with pt at bedside, MSW discussed PHQ-4 but pt does not want resources because she already sees a Counselor fr

## 2021-04-16 ENCOUNTER — NURSE NAVIGATOR ENCOUNTER (OUTPATIENT)
Dept: HEMATOLOGY/ONCOLOGY | Facility: HOSPITAL | Age: 46
End: 2021-04-16

## 2021-04-16 NOTE — PROGRESS NOTES
Spoke with patient regarding recent surgery and pathology. Pt is doing well, she did have a headache, but was finally able to take ibuprofen today and HA has resolved. If it comes back and ibuprofen does not help, she should call the office.    We discussed

## 2021-04-16 NOTE — PAYOR COMM NOTE
--------------  DISCHARGE REVIEW    Payor: [address] [place] [address] Road #:  [id number]  Authorization Number: [id number]    Admit date: N/A  Admit time:  N/A  Discharge Date: 4/13/2021  5:23 PM     Admitting Physician: Glenny Flor

## 2021-04-19 ENCOUNTER — OFFICE VISIT (OUTPATIENT)
Dept: SURGERY | Facility: CLINIC | Age: 46
End: 2021-04-19
Payer: COMMERCIAL

## 2021-04-19 DIAGNOSIS — Z90.13 ABSENCE OF BREAST, BILATERAL: Primary | ICD-10-CM

## 2021-04-19 PROCEDURE — 99024 POSTOP FOLLOW-UP VISIT: CPT | Performed by: PHYSICIAN ASSISTANT

## 2021-04-19 NOTE — PROGRESS NOTES
Breast Surgery Post-Operative Visit    Diagnosis: Left breast DCIS status post bilateral nipple sparing mastectomy with left sentinel lymph node biopsy, with wound closure on 4/12/21     Stage: Tis N0 MX    Disease Status:  Surgical treatment complete, no EXC SKIN BENIG 1.1-2CM FACE,FACIAL  1/20/10    Performed by Brandt Moreau at [facility]   • [address] Right 2/26/2016    Procedure: LITHOTRIPSY WITH CYSTOSCOPY, STENT PLACEMENT;  Surgeon: Edward Cochran MD;  Chloe Years   Quit date: 3/15/2021   Smokeless tobacco: Never Used   The patient is single. She has 2 children. She is employed full-time.     Review of Systems:  General:   The patient denies, fever, chills, night sweats, fatigue, generalized weakness, change denies muscle aches/pain, joint pain, stiff joints, neck pain, back pain or bone pain.     Neuropsychiatric:  There is no history of migraines or severe headaches, seizure/epilepsy, speech problems, coordination problems, trembling/tremors, fainting/black o significant lymphadenopathy. Back: There is no vertebral column tenderness. Skin: The skin appears normal. There are no suspicious appearing rashes or lesions. Extremities: The extremities are without deformity, cyanosis or edema.     Impression:

## 2021-04-19 NOTE — PROGRESS NOTES
This is a 51-year-old female that is 7 days status post her bilateral nipple sparing mastectomies with left sentinel lymph node biopsy by Dr. Radha Plunkett with wound closure due to suboptimal skin perfusion after evaluation by the Consuelo Calzada.   She is here today for pos was more comfortable and easier to use independently. We reviewed the process for reconstruction being tissue expanders placed following plate healing with second stage reconstruction with implant-based recon.  She will meet with oncology tomorrow to find o

## 2021-04-20 ENCOUNTER — NURSE NAVIGATOR ENCOUNTER (OUTPATIENT)
Dept: HEMATOLOGY/ONCOLOGY | Facility: HOSPITAL | Age: 46
End: 2021-04-20

## 2021-04-20 ENCOUNTER — OFFICE VISIT (OUTPATIENT)
Dept: SURGERY | Facility: CLINIC | Age: 46
End: 2021-04-20
Payer: COMMERCIAL

## 2021-04-20 VITALS
WEIGHT: 105.81 LBS | BODY MASS INDEX: 18.51 KG/M2 | HEIGHT: 63.5 IN | RESPIRATION RATE: 18 BRPM | SYSTOLIC BLOOD PRESSURE: 101 MMHG | OXYGEN SATURATION: 98 % | DIASTOLIC BLOOD PRESSURE: 66 MMHG | TEMPERATURE: 97 F | HEART RATE: 64 BPM

## 2021-04-20 DIAGNOSIS — Z90.13 ABSENCE OF BREAST, BILATERAL: ICD-10-CM

## 2021-04-20 DIAGNOSIS — D05.12 BREAST NEOPLASM, TIS (DCIS), LEFT: Primary | ICD-10-CM

## 2021-04-20 PROCEDURE — 3008F BODY MASS INDEX DOCD: CPT | Performed by: SURGERY

## 2021-04-20 PROCEDURE — 3074F SYST BP LT 130 MM HG: CPT | Performed by: SURGERY

## 2021-04-20 PROCEDURE — 99024 POSTOP FOLLOW-UP VISIT: CPT | Performed by: SURGERY

## 2021-04-20 PROCEDURE — 3078F DIAST BP <80 MM HG: CPT | Performed by: SURGERY

## 2021-04-20 RX ORDER — IBUPROFEN 200 MG
200 TABLET ORAL EVERY 6 HOURS PRN
Status: ON HOLD | COMMUNITY

## 2021-04-20 NOTE — PROGRESS NOTES
Met with patient and patient's sister during Dr. Cristy Clarke clinic and stated to patient that during our multidisciplinary conference today stated that patient is done with her cancer treatment and does not need any further treatment or follow up with Dr. Urmila Rodrigues

## 2021-04-21 ENCOUNTER — APPOINTMENT (OUTPATIENT)
Dept: HEMATOLOGY/ONCOLOGY | Facility: HOSPITAL | Age: 46
End: 2021-04-21
Attending: INTERNAL MEDICINE
Payer: COMMERCIAL

## 2021-04-22 ENCOUNTER — NURSE ONLY (OUTPATIENT)
Dept: SURGERY | Facility: CLINIC | Age: 46
End: 2021-04-22
Payer: COMMERCIAL

## 2021-04-22 NOTE — PROGRESS NOTES
The patient presents today drain removal.  Per patient's written record, left breast drain was below 30cc for two consecutive days. LEFT drain was removed due to low output and the patient tolerated this well. Neosporin and gauze dressing was placed.

## 2021-04-28 ENCOUNTER — NURSE ONLY (OUTPATIENT)
Dept: SURGERY | Facility: CLINIC | Age: 46
End: 2021-04-28
Payer: COMMERCIAL

## 2021-04-28 NOTE — PROGRESS NOTES
Patient called office today stating that she has a fowl smell coming from her drain bulb. Patient denies any fevers, redness or swelling. Patient stated that her drain site was a little red. Patient was asked to send a photo to secure email. Patient did.  P

## 2021-05-04 ENCOUNTER — OFFICE VISIT (OUTPATIENT)
Dept: SURGERY | Facility: CLINIC | Age: 46
End: 2021-05-04
Payer: COMMERCIAL

## 2021-05-04 DIAGNOSIS — Z90.13 ABSENCE OF BREAST, BILATERAL: Primary | ICD-10-CM

## 2021-05-04 PROCEDURE — 99024 POSTOP FOLLOW-UP VISIT: CPT | Performed by: SURGERY

## 2021-05-04 NOTE — PROGRESS NOTES
Cait Cardenas is a 42year old female who presents today for a follow-up. She denies fever and chills. She denies nausea, vomiting, diarrhea or constipation. The patient relates that she has not used any tobacco products and surgery.       Physical Examinat

## 2021-05-11 ENCOUNTER — OFFICE VISIT (OUTPATIENT)
Dept: SURGERY | Facility: CLINIC | Age: 46
End: 2021-05-11
Payer: COMMERCIAL

## 2021-05-11 DIAGNOSIS — Z90.13 ABSENCE OF BREAST, BILATERAL: Primary | ICD-10-CM

## 2021-05-11 PROCEDURE — 99024 POSTOP FOLLOW-UP VISIT: CPT | Performed by: SURGERY

## 2021-05-11 NOTE — PROGRESS NOTES
Dalila Malloy is a 42year old female who presents today for a follow-up. She has not resumed using tobacco products. Physical Examination:  Breasts:  The left nipple areolar complex is well-healed and the marginal eschar has sloughed with the deep laye

## 2021-05-18 ENCOUNTER — TELEPHONE (OUTPATIENT)
Dept: SURGERY | Facility: CLINIC | Age: 46
End: 2021-05-18

## 2021-05-18 NOTE — TELEPHONE ENCOUNTER
Patient called to report slight right breast swelling. Denies fever, redness or erythema. Was offered an appt with Dr. Berry Tucker for Friday for possible seroma aspiration. She was appreciative of the help.

## 2021-05-21 ENCOUNTER — OFFICE VISIT (OUTPATIENT)
Dept: SURGERY | Facility: CLINIC | Age: 46
End: 2021-05-21
Payer: COMMERCIAL

## 2021-05-21 DIAGNOSIS — Z90.13 ABSENCE OF BREAST, BILATERAL: Primary | ICD-10-CM

## 2021-05-21 PROCEDURE — 99024 POSTOP FOLLOW-UP VISIT: CPT | Performed by: SURGERY

## 2021-06-11 ENCOUNTER — OFFICE VISIT (OUTPATIENT)
Dept: SURGERY | Facility: CLINIC | Age: 46
End: 2021-06-11
Payer: COMMERCIAL

## 2021-06-11 DIAGNOSIS — D05.12 DUCTAL CARCINOMA IN SITU (DCIS) OF LEFT BREAST: Primary | ICD-10-CM

## 2021-06-11 DIAGNOSIS — Z90.13 ABSENCE OF BREAST, BILATERAL: ICD-10-CM

## 2021-06-11 PROCEDURE — 99024 POSTOP FOLLOW-UP VISIT: CPT | Performed by: SURGERY

## 2021-06-11 NOTE — PROGRESS NOTES
Anita Rodriguez is a 42year old female who presents today for a follow-up. She denies fever and chills. She denies nausea, vomiting, diarrhea or constipation. Physical Examination:  Breasts: Bilateral breast incisions are well-healed.   There is no erythe

## 2021-06-11 NOTE — PATIENT INSTRUCTIONS
Surgeon:         Dr. Kim Storey                                        Tel:         [phone]                                  Fax:        [phone]    Surgery/Procedure:    Delay breast reconstruction with placement of Bilateral tissue expanders a surgery with Dr. Esther Alvarado  CBC,CMP  EKG  Urine nicotine test  Consent obtained __________  Photos taken on _________

## 2021-06-22 ENCOUNTER — TELEPHONE (OUTPATIENT)
Dept: SURGERY | Facility: CLINIC | Age: 46
End: 2021-06-22

## 2021-06-22 DIAGNOSIS — Z90.13 ABSENCE OF BREAST, BILATERAL: Primary | ICD-10-CM

## 2021-06-22 NOTE — TELEPHONE ENCOUNTER
Pt called back in regards to scheduling surgery. Informed pt that I have 09/15/21 available at [facility] or 09/16/21 at [facility] with Dr. Loreta Rizzo. Pt states she would like the 09/16/21 surgery date. All questions answered.    Encouraged pt

## 2021-06-22 NOTE — TELEPHONE ENCOUNTER
Patient called with post op questions. All post op questions answered. Patient verbalized understanding.

## 2021-06-28 ENCOUNTER — TELEPHONE (OUTPATIENT)
Dept: SURGERY | Facility: CLINIC | Age: 46
End: 2021-06-28

## 2021-06-28 DIAGNOSIS — Z90.13 ABSENCE OF BREAST, BILATERAL: Primary | ICD-10-CM

## 2021-06-28 NOTE — TELEPHONE ENCOUNTER
Call from pt stating that she needs to reschedule her surgery that is currently scheduled with Dr. Ryan Simms on 09/16/21 at [address] states she would like to move her surgery to the end of October and would like [facility]. Informed pt we have 10/20/

## 2021-07-13 ENCOUNTER — TELEPHONE (OUTPATIENT)
Dept: PHYSICAL THERAPY | Facility: HOSPITAL | Age: 46
End: 2021-07-13

## 2021-07-14 ENCOUNTER — OFFICE VISIT (OUTPATIENT)
Dept: OCCUPATIONAL MEDICINE | Facility: HOSPITAL | Age: 46
End: 2021-07-14
Attending: SURGERY
Payer: COMMERCIAL

## 2021-07-14 DIAGNOSIS — D05.12 DUCTAL CARCINOMA IN SITU (DCIS) OF LEFT BREAST: ICD-10-CM

## 2021-07-14 PROCEDURE — 97140 MANUAL THERAPY 1/> REGIONS: CPT

## 2021-07-14 PROCEDURE — 97166 OT EVAL MOD COMPLEX 45 MIN: CPT

## 2021-07-14 PROCEDURE — 97535 SELF CARE MNGMENT TRAINING: CPT

## 2021-07-14 NOTE — PROGRESS NOTES
UE LYMPHEDEMA EVALUATION:   Referring Physician: Dr. Lucy Torrez  Diagnosis: Lymphatic vessel changes/lymphedema (I97.2) following mastectomy for Ductal carcinoma in situ (DCIS) of left breast (L44.72)    Date of Service: 7/14/2021     PATIENT SUMMARY   Elizabeth Presence of abnormal cording creates discomfort and limits Elizabeth's Left shoulder functional ROM/use. Presence of lymphedema increases her infection risk and risk of further volume increase and negative tissue changes.      Pt and therapist discussed evaluatio (to be met in 12 visits)  1. Pt will be independent in tissue tension release and home exercises. 2. Pt will tolerate Left UE light compression via Tensogrip sleeve for 6-9 hours.    3. Pt will tolerate therapeutic level of soft tissue mobilization of Le crease 21.5 20.8   Elbow crease 21.7 21.6          **18cm above ulnar styloid   20.4   21.0   15cm above ulnar styloid 19.2 20.1   10cm above ulnar styloid 16.6 17.0   5cm above ulnar styloid 15.0 14.9   Ulnar styloid 14.9 14.9     Mid-palm   17.4   17.7

## 2021-07-20 ENCOUNTER — OFFICE VISIT (OUTPATIENT)
Dept: OCCUPATIONAL MEDICINE | Facility: HOSPITAL | Age: 46
End: 2021-07-20
Attending: SURGERY
Payer: COMMERCIAL

## 2021-07-20 PROCEDURE — 97140 MANUAL THERAPY 1/> REGIONS: CPT

## 2021-07-20 NOTE — PROGRESS NOTES
Dx: Lymphatic vessel changes/lymphedema (I97.2) following mastectomy for Ductal carcinoma in situ (DCIS) of left breast (D05.12)   Insurance (Authorized # of Visits): 2/12     Next MD/Plan Renewal Date: 10/12/2021    Authorizing Physician: Dr. Christina Rodney mobilization of Left UE abnormal cording. 4. Pt will be independent in self-manual lymph drainage. 5. Improve mobility of abnormal cording to improve Left shoulder pain-free AROM by 10-35 degrees to allow pt to reach into overhead cabinets.    6. Inde

## 2021-07-22 ENCOUNTER — OFFICE VISIT (OUTPATIENT)
Dept: OCCUPATIONAL MEDICINE | Facility: HOSPITAL | Age: 46
End: 2021-07-22
Attending: SURGERY
Payer: COMMERCIAL

## 2021-07-22 PROCEDURE — 97140 MANUAL THERAPY 1/> REGIONS: CPT

## 2021-07-22 NOTE — PROGRESS NOTES
Dx: Lymphatic vessel changes/lymphedema (I97.2) following mastectomy for Ductal carcinoma in situ (DCIS) of left breast (D05.12)   Insurance (Authorized # of Visits): 3/12     Next MD/Plan Renewal Date: 10/12/2021    Authorizing Physician: Dr. Wendi Reynoso 6-9 hours. ACHIEVED   3. Pt will tolerate therapeutic level of soft tissue mobilization of Left UE abnormal cording. ACHIEVED   4. Pt will be independent in self-manual lymph drainage.      5. Improve mobility of abnormal cording to improve Left shoulder pa

## 2021-07-27 ENCOUNTER — OFFICE VISIT (OUTPATIENT)
Dept: OCCUPATIONAL MEDICINE | Facility: HOSPITAL | Age: 46
End: 2021-07-27
Attending: SURGERY
Payer: COMMERCIAL

## 2021-07-27 PROCEDURE — 97140 MANUAL THERAPY 1/> REGIONS: CPT

## 2021-07-27 NOTE — PROGRESS NOTES
Dx: Lymphatic vessel changes/lymphedema (I97.2) following mastectomy for Ductal carcinoma in situ (DCIS) of left breast (D05.12)   Insurance (Authorized # of Visits): 4/12     Next MD/Plan Renewal Date: 10/12/2021    Authorizing Physician: Dr. Roberto Carlos Salazar light compression via Tensogrip sleeve for 6-9 hours. ACHIEVED   3. Pt will tolerate therapeutic level of soft tissue mobilization of Left UE abnormal cording. ACHIEVED   4. Pt will be independent in self-manual lymph drainage.      5. Improve mobility of a

## 2021-07-29 ENCOUNTER — OFFICE VISIT (OUTPATIENT)
Dept: OCCUPATIONAL MEDICINE | Facility: HOSPITAL | Age: 46
End: 2021-07-29
Attending: SURGERY
Payer: COMMERCIAL

## 2021-07-29 PROCEDURE — 97140 MANUAL THERAPY 1/> REGIONS: CPT

## 2021-07-29 NOTE — PROGRESS NOTES
Dx: Lymphatic vessel changes/lymphedema (I97.2) following mastectomy for Ductal carcinoma in situ (DCIS) of left breast (D05.12)   Insurance (Authorized # of Visits): 5/12     Next MD/Plan Renewal Date: 10/12/2021    Authorizing Physician: Dr. David Norton custom-fit sleeve. Pt acknowledging understanding; would like to proceed with being fitted for a sleeve.    COMPRESSION:  *Left UE:  size \"C\" Tensogrip sleeve with 2-ply layer over forearm     Assessment:   Pt making progress towards mobility of abnormal

## 2021-08-03 ENCOUNTER — OFFICE VISIT (OUTPATIENT)
Dept: OCCUPATIONAL MEDICINE | Facility: HOSPITAL | Age: 46
End: 2021-08-03
Attending: SURGERY
Payer: COMMERCIAL

## 2021-08-03 PROCEDURE — 97140 MANUAL THERAPY 1/> REGIONS: CPT

## 2021-08-03 NOTE — PROGRESS NOTES
Dx: Lymphatic vessel changes/lymphedema (I97.2) following mastectomy for Ductal carcinoma in situ (DCIS) of left breast (D05.12)   Insurance (Authorized # of Visits): 6/12     Next MD/Plan Renewal Date: 10/12/2021    Authorizing Physician: Dr. Dexter Hermosillo of pt's report of tighter tissue. *Left upper quadrant manual lymph drainage  *Pt donned Tensogrip sleeve. *Confirmed that pt can use EZ band for chest compression without protheses in place.    COMPRESSION:  *Left UE:  size \"C\" Tensogrip sleeve with

## 2021-08-05 ENCOUNTER — OFFICE VISIT (OUTPATIENT)
Dept: OCCUPATIONAL MEDICINE | Facility: HOSPITAL | Age: 46
End: 2021-08-05
Attending: SURGERY
Payer: COMMERCIAL

## 2021-08-05 PROCEDURE — 97140 MANUAL THERAPY 1/> REGIONS: CPT

## 2021-08-05 NOTE — PROGRESS NOTES
Dx: Lymphatic vessel changes/lymphedema (I97.2) following mastectomy for Ductal carcinoma in situ (DCIS) of left breast (D05.12)   Insurance (Authorized # of Visits): 7/12     Next MD/Plan Renewal Date: 10/12/2021    Authorizing Physician: Dr. Isabela Dong over forearm     Assessment:   Pt making progress towards improvement in comfort of Left LND scar and area of abnormal cording. Goals:  (to be met in 12 visits)  1. Pt will be independent in tissue tension release and home exercises. ACHIEVED   2.  Pt w

## 2021-08-10 ENCOUNTER — OFFICE VISIT (OUTPATIENT)
Dept: OCCUPATIONAL MEDICINE | Facility: HOSPITAL | Age: 46
End: 2021-08-10
Attending: SURGERY
Payer: COMMERCIAL

## 2021-08-10 PROCEDURE — 97140 MANUAL THERAPY 1/> REGIONS: CPT

## 2021-08-11 NOTE — PROGRESS NOTES
Dx: Lymphatic vessel changes/lymphedema (I97.2) following mastectomy for Ductal carcinoma in situ (DCIS) of left breast (D05.12)   Insurance (Authorized # of Visits): 8/12     Next MD/Plan Renewal Date: 10/12/2021    Authorizing Physician: Dr. Sherri Toussaint visits)  1. Pt will be independent in tissue tension release and home exercises. ACHIEVED   2. Pt will tolerate Left UE light compression via Tensogrip sleeve for 6-9 hours. ACHIEVED   3.  Pt will tolerate therapeutic level of soft tissue mobilization of Le

## 2021-08-12 ENCOUNTER — OFFICE VISIT (OUTPATIENT)
Dept: OCCUPATIONAL MEDICINE | Facility: HOSPITAL | Age: 46
End: 2021-08-12
Attending: SURGERY
Payer: COMMERCIAL

## 2021-08-12 PROCEDURE — 97140 MANUAL THERAPY 1/> REGIONS: CPT

## 2021-08-12 NOTE — PROGRESS NOTES
Dx: Lymphatic vessel changes/lymphedema (I97.2) following mastectomy for Ductal carcinoma in situ (DCIS) of left breast (D05.12)   Insurance (Authorized # of Visits): 9/12     Next MD/Plan Renewal Date: 10/12/2021    Authorizing Physician: Dr. Efra Diehl quarter-sized area of subtle volume over medial surface of mid-upper arm where abnormal cording terminates. Subtle volume over Left upper chest has resolved.  Two, mildly hypomobile abnormal cordlike structures palpated in Left axilla with extension into me Objective:  Arrived with Tensogrip sleeve on. OBSERVATION:   *Quarter-sized area of subtle volume over medial surface of mid-upper arm where abnormal cording terminates.     *Two, mildly hypomobile abnormal cordlike structures palpated in Left axill above elbow crease 20.9 20.8   Elbow crease 21.3 21.6          **18cm above ulnar styloid   20.2   21.1   15cm above ulnar styloid 19.0 20.1   10cm above ulnar styloid 16.3 17.0   5cm above ulnar styloid 14.7 14.9   Ulnar styloid 14.8 14.8     Mid-palm   1

## 2021-08-18 ENCOUNTER — OFFICE VISIT (OUTPATIENT)
Dept: OCCUPATIONAL MEDICINE | Facility: HOSPITAL | Age: 46
End: 2021-08-18
Attending: SURGERY
Payer: COMMERCIAL

## 2021-08-18 PROCEDURE — 97535 SELF CARE MNGMENT TRAINING: CPT

## 2021-08-18 PROCEDURE — 97140 MANUAL THERAPY 1/> REGIONS: CPT

## 2021-08-19 ENCOUNTER — TELEPHONE (OUTPATIENT)
Dept: SURGERY | Facility: CLINIC | Age: 46
End: 2021-08-19

## 2021-08-19 NOTE — TELEPHONE ENCOUNTER
Patient called to ask where to send FMLA paper work. Patient also had some post op questions. Fax and email given. All post question answered.

## 2021-08-24 ENCOUNTER — OFFICE VISIT (OUTPATIENT)
Dept: OCCUPATIONAL MEDICINE | Facility: HOSPITAL | Age: 46
End: 2021-08-24
Attending: SURGERY
Payer: COMMERCIAL

## 2021-08-24 PROCEDURE — 97140 MANUAL THERAPY 1/> REGIONS: CPT

## 2021-08-24 NOTE — PROGRESS NOTES
Dx: Lymphatic vessel changes/lymphedema (I97.2) following mastectomy for Ductal carcinoma in situ (DCIS) of left breast (D05.12)   Insurance (Authorized # of Visits): 11/12     Next MD/Plan Renewal Date: 10/12/2021    Authorizing Physician: Dr. Juana Storey mobility following techniques with pt verifying improvement in mobility and comfort of area. *Left upper quadrant manual lymph drainage  *Observation of pt's technique for axillary/chest wall stretch with use of wall.  Provided corrections to improve qual ulnar styloid     10cm above ulnar styloid     5cm above ulnar styloid     Ulnar styloid       Mid-palm     Across MCPs     Thumb P1 -- --   Index P1 -- --   P2 -- --   Middle P1 -- --   P2 -- --   Ring P1 -- --   P2 -- --   Little P1 -- --   P2 -- --

## 2021-08-26 ENCOUNTER — OFFICE VISIT (OUTPATIENT)
Dept: OCCUPATIONAL MEDICINE | Facility: HOSPITAL | Age: 46
End: 2021-08-26
Attending: SURGERY
Payer: COMMERCIAL

## 2021-08-26 PROCEDURE — 97140 MANUAL THERAPY 1/> REGIONS: CPT

## 2021-08-26 PROCEDURE — 97110 THERAPEUTIC EXERCISES: CPT

## 2021-08-26 NOTE — PROGRESS NOTES
Discharge Summary  Pt has attended 12/12 visits in Occupational Therapy from 7/14-8/26/21. Subjective:   Pt reports daily use of Left compression sleeve and completion of self-manual lymph drainage. Added new stretch from last session to HEP.  Reports upper quadrant manual lymph drainage; instruction in skin care, lymphedema risk reduction, self-manual lymph drainage; progression of HEP; assist with garment prescription. She has obtained a good fitting compression sleeve.  GARMENT SPECIFICS: Juzo Soft OT be independent in tissue tension release and home exercises. ACHIEVED   2. Pt will tolerate Left UE light compression via Tensogrip sleeve for 6-9 hours. ACHIEVED   3.  Pt will tolerate therapeutic level of soft tissue mobilization of Left UE abnormal cordi

## 2021-09-02 ENCOUNTER — TELEPHONE (OUTPATIENT)
Dept: OCCUPATIONAL MEDICINE | Facility: HOSPITAL | Age: 46
End: 2021-09-02

## 2021-09-02 NOTE — TELEPHONE ENCOUNTER
Given report of return of pain at last therapy session, contacted pt to discuss current status.  Pt stating that her pain has resolved since last week; does report she does continue to experience some \"discomfort\" in her Left LND scar with heavy lifting;

## 2021-09-23 ENCOUNTER — SOCIAL WORK SERVICES (OUTPATIENT)
Dept: HEMATOLOGY/ONCOLOGY | Facility: HOSPITAL | Age: 46
End: 2021-09-23

## 2021-09-23 NOTE — PROGRESS NOTES
Sw completed [facility] paperwork for patient per surgery instructions. Completed paperwork was faxed to the [facility] at [phone].

## 2021-09-28 ENCOUNTER — TELEPHONE (OUTPATIENT)
Dept: SURGERY | Facility: CLINIC | Age: 46
End: 2021-09-28

## 2021-09-28 NOTE — TELEPHONE ENCOUNTER
Spoke to patient who wanted an update on FMLA forms. The forms were sent by [employer] on 9/23. patient wanted a copy and requested them to be emailed via secure email address.

## 2021-10-11 PROBLEM — Z78.9 EX-SMOKER FOR LESS THAN 1 YEAR: Status: ACTIVE | Noted: 2017-02-22

## 2021-10-20 RX ORDER — HEPARIN SODIUM 5000 [USP'U]/ML
5000 INJECTION, SOLUTION INTRAVENOUS; SUBCUTANEOUS ONCE
Status: CANCELLED | OUTPATIENT
Start: 2021-10-20 | End: 2021-10-20

## 2021-10-20 RX ORDER — ACETAMINOPHEN 500 MG
1000 TABLET ORAL ONCE
Status: CANCELLED | OUTPATIENT
Start: 2021-10-20 | End: 2021-10-20

## 2021-10-25 ENCOUNTER — OFFICE VISIT (OUTPATIENT)
Dept: SURGERY | Facility: CLINIC | Age: 46
End: 2021-10-25
Payer: COMMERCIAL

## 2021-10-25 ENCOUNTER — LAB ENCOUNTER (OUTPATIENT)
Dept: LAB | Age: 46
End: 2021-10-25
Attending: SURGERY
Payer: COMMERCIAL

## 2021-10-25 VITALS
HEIGHT: 63 IN | OXYGEN SATURATION: 98 % | DIASTOLIC BLOOD PRESSURE: 76 MMHG | RESPIRATION RATE: 18 BRPM | BODY MASS INDEX: 19.28 KG/M2 | WEIGHT: 108.81 LBS | SYSTOLIC BLOOD PRESSURE: 126 MMHG | TEMPERATURE: 97 F

## 2021-10-25 DIAGNOSIS — Z90.13 ABSENCE OF BREAST, BILATERAL: ICD-10-CM

## 2021-10-25 DIAGNOSIS — D05.12 DUCTAL CARCINOMA IN SITU (DCIS) OF LEFT BREAST: Primary | ICD-10-CM

## 2021-10-25 PROCEDURE — 99214 OFFICE O/P EST MOD 30 MIN: CPT | Performed by: SURGERY

## 2021-10-25 PROCEDURE — 3008F BODY MASS INDEX DOCD: CPT | Performed by: SURGERY

## 2021-10-25 PROCEDURE — 3078F DIAST BP <80 MM HG: CPT | Performed by: SURGERY

## 2021-10-25 PROCEDURE — 3074F SYST BP LT 130 MM HG: CPT | Performed by: SURGERY

## 2021-10-25 NOTE — PROGRESS NOTES
Breast Surgery Surveillance Visit    Diagnosis: Left breast DCIS status post bilateral nipple sparing mastectomy with left sentinel lymph node biopsy, with wound closure on 4/12/21     Stage: Tis N0 MX    Disease Status:  Surgical treatment complete, no fu 2/26/2016    Procedure: LITHOTRIPSY WITH CYSTOSCOPY, STENT PLACEMENT;  Surgeon: Jana Nathan MD;  Location: [address] [place] [address]   • D & C      elective ab x3   • EXC SKIN Lobito Mcmanus 1.1-2CM FACE,FACIAL  1/20/10    Performed by Gutierrez Turner at stop (Patient taking differently: Take 10 mg by mouth daily.), Disp: 30 tablet, Rfl: 0  ibuprofen 200 MG Oral Tab, Take 200 mg by mouth every 6 (six) hours as needed for Pain., Disp: , Rfl:   Multiple Vitamins-Minerals (GNP HAIR/SKIN/NAILS) Oral Tab, Take Cardiovascular: There is no history of chest pain, chest pressure/discomfort, palpitations, irregular heartbeat, fainting or near-fainting, difficulty breathing when lying flat, SOB/Coughing at night, swelling of the legs or chest pain while walking. Resp 18   Ht 1.6 m (5' 3\")   Wt 49.4 kg (108 lb 12.8 oz)   LMP 02/11/2014 (LMP Unknown)   SpO2 98%   BMI 19.27 kg/m²     Physical Exam:  The patient is an alert, oriented, well-nourished and  well-developed woman who appears her stated age.  Her speech pat reconstruction. She had a residual area of DCIS with negative margins for which no further treatment will be needed. She will return to see me in 6 months for clinical exam. She will no longer need mammograms status post bilateral mastectomies.  She will

## 2021-10-26 ENCOUNTER — ANESTHESIA EVENT (OUTPATIENT)
Dept: SURGERY | Facility: HOSPITAL | Age: 46
End: 2021-10-26
Payer: COMMERCIAL

## 2021-10-27 ENCOUNTER — HOSPITAL ENCOUNTER (OUTPATIENT)
Facility: HOSPITAL | Age: 46
Setting detail: HOSPITAL OUTPATIENT SURGERY
Discharge: HOME | End: 2021-10-27
Attending: SURGERY | Admitting: SURGERY
Payer: COMMERCIAL

## 2021-10-27 ENCOUNTER — ANESTHESIA (OUTPATIENT)
Dept: SURGERY | Facility: HOSPITAL | Age: 46
End: 2021-10-27
Payer: COMMERCIAL

## 2021-10-27 VITALS
TEMPERATURE: 98 F | RESPIRATION RATE: 16 BRPM | SYSTOLIC BLOOD PRESSURE: 139 MMHG | BODY MASS INDEX: 19.03 KG/M2 | HEIGHT: 63 IN | HEART RATE: 58 BPM | DIASTOLIC BLOOD PRESSURE: 86 MMHG | OXYGEN SATURATION: 99 % | WEIGHT: 107.38 LBS

## 2021-10-27 DIAGNOSIS — Z90.13 ABSENCE OF BREAST, BILATERAL: Primary | ICD-10-CM

## 2021-10-27 PROCEDURE — 0HUV0KZ SUPPLEMENT BILATERAL BREAST WITH NONAUTOLOGOUS TISSUE SUBSTITUTE, OPEN APPROACH: ICD-10-PCS | Performed by: SURGERY

## 2021-10-27 PROCEDURE — 0HHV0NZ INSERTION OF TISSUE EXPANDER INTO BILATERAL BREAST, OPEN APPROACH: ICD-10-PCS | Performed by: SURGERY

## 2021-10-27 DEVICE — MATRIX ALLODERM SELECT: Type: IMPLANTABLE DEVICE | Site: BREAST | Status: FUNCTIONAL

## 2021-10-27 DEVICE — BREAST TISSUE EXPANDER, SUTURE TABS, INTEGRAL INJECTION DOME, 375CC
Type: IMPLANTABLE DEVICE | Site: BREAST | Status: FUNCTIONAL
Brand: MENTOR ARTOURA PLUS, SMOOTH, HIGH PROFILE

## 2021-10-27 RX ORDER — HYDROMORPHONE HYDROCHLORIDE 1 MG/ML
INJECTION, SOLUTION INTRAMUSCULAR; INTRAVENOUS; SUBCUTANEOUS
Status: COMPLETED
Start: 2021-10-27 | End: 2021-10-27

## 2021-10-27 RX ORDER — ONDANSETRON 4 MG/1
4 TABLET, FILM COATED ORAL EVERY 8 HOURS PRN
Qty: 30 TABLET | Refills: 0 | Status: SHIPPED | OUTPATIENT
Start: 2021-10-27 | End: 2022-01-28

## 2021-10-27 RX ORDER — NEOSTIGMINE METHYLSULFATE 1 MG/ML
INJECTION INTRAVENOUS AS NEEDED
Status: DISCONTINUED | OUTPATIENT
Start: 2021-10-27 | End: 2021-10-27 | Stop reason: SURG

## 2021-10-27 RX ORDER — BUPIVACAINE HYDROCHLORIDE 5 MG/ML
INJECTION, SOLUTION EPIDURAL; INTRACAUDAL AS NEEDED
Status: DISCONTINUED | OUTPATIENT
Start: 2021-10-27 | End: 2021-10-27 | Stop reason: HOSPADM

## 2021-10-27 RX ORDER — CEFAZOLIN SODIUM/WATER 2 G/20 ML
2 SYRINGE (ML) INTRAVENOUS ONCE
Status: COMPLETED | OUTPATIENT
Start: 2021-10-27 | End: 2021-10-27

## 2021-10-27 RX ORDER — ACETAMINOPHEN 500 MG
1000 TABLET ORAL ONCE AS NEEDED
Status: DISCONTINUED | OUTPATIENT
Start: 2021-10-27 | End: 2021-10-27

## 2021-10-27 RX ORDER — MIDAZOLAM HYDROCHLORIDE 1 MG/ML
INJECTION INTRAMUSCULAR; INTRAVENOUS AS NEEDED
Status: DISCONTINUED | OUTPATIENT
Start: 2021-10-27 | End: 2021-10-27 | Stop reason: SURG

## 2021-10-27 RX ORDER — LIDOCAINE HYDROCHLORIDE AND EPINEPHRINE 10; 10 MG/ML; UG/ML
INJECTION, SOLUTION INFILTRATION; PERINEURAL AS NEEDED
Status: DISCONTINUED | OUTPATIENT
Start: 2021-10-27 | End: 2021-10-27 | Stop reason: HOSPADM

## 2021-10-27 RX ORDER — NALOXONE HYDROCHLORIDE 0.4 MG/ML
80 INJECTION, SOLUTION INTRAMUSCULAR; INTRAVENOUS; SUBCUTANEOUS AS NEEDED
Status: DISCONTINUED | OUTPATIENT
Start: 2021-10-27 | End: 2021-10-27

## 2021-10-27 RX ORDER — ONDANSETRON 2 MG/ML
4 INJECTION INTRAMUSCULAR; INTRAVENOUS AS NEEDED
Status: DISCONTINUED | OUTPATIENT
Start: 2021-10-27 | End: 2021-10-27

## 2021-10-27 RX ORDER — ACETAMINOPHEN 500 MG
1000 TABLET ORAL ONCE
COMMUNITY
End: 2022-01-28

## 2021-10-27 RX ORDER — HYDROCODONE BITARTRATE AND ACETAMINOPHEN 5; 325 MG/1; MG/1
2 TABLET ORAL AS NEEDED
Status: COMPLETED | OUTPATIENT
Start: 2021-10-27 | End: 2021-10-27

## 2021-10-27 RX ORDER — ROCURONIUM BROMIDE 10 MG/ML
INJECTION, SOLUTION INTRAVENOUS AS NEEDED
Status: DISCONTINUED | OUTPATIENT
Start: 2021-10-27 | End: 2021-10-27 | Stop reason: SURG

## 2021-10-27 RX ORDER — DOCUSATE SODIUM 100 MG/1
100 CAPSULE, LIQUID FILLED ORAL 2 TIMES DAILY
Qty: 40 CAPSULE | Refills: 0 | Status: SHIPPED | OUTPATIENT
Start: 2021-10-27 | End: 2022-01-28

## 2021-10-27 RX ORDER — CEPHALEXIN 500 MG/1
500 CAPSULE ORAL 4 TIMES DAILY
Qty: 40 CAPSULE | Refills: 2 | Status: SHIPPED | OUTPATIENT
Start: 2021-10-27 | End: 2021-11-06

## 2021-10-27 RX ORDER — METOCLOPRAMIDE HYDROCHLORIDE 5 MG/ML
10 INJECTION INTRAMUSCULAR; INTRAVENOUS AS NEEDED
Status: DISCONTINUED | OUTPATIENT
Start: 2021-10-27 | End: 2021-10-27

## 2021-10-27 RX ORDER — SODIUM CHLORIDE, SODIUM LACTATE, POTASSIUM CHLORIDE, CALCIUM CHLORIDE 600; 310; 30; 20 MG/100ML; MG/100ML; MG/100ML; MG/100ML
INJECTION, SOLUTION INTRAVENOUS CONTINUOUS
Status: DISCONTINUED | OUTPATIENT
Start: 2021-10-27 | End: 2021-10-27

## 2021-10-27 RX ORDER — HYDROCODONE BITARTRATE AND ACETAMINOPHEN 5; 325 MG/1; MG/1
1-2 TABLET ORAL EVERY 4 HOURS PRN
Qty: 40 TABLET | Refills: 0 | Status: SHIPPED | OUTPATIENT
Start: 2021-10-27 | End: 2022-01-28

## 2021-10-27 RX ORDER — DIPHENHYDRAMINE HYDROCHLORIDE 50 MG/ML
12.5 INJECTION INTRAMUSCULAR; INTRAVENOUS AS NEEDED
Status: DISCONTINUED | OUTPATIENT
Start: 2021-10-27 | End: 2021-10-27

## 2021-10-27 RX ORDER — HYDROMORPHONE HYDROCHLORIDE 1 MG/ML
0.4 INJECTION, SOLUTION INTRAMUSCULAR; INTRAVENOUS; SUBCUTANEOUS EVERY 5 MIN PRN
Status: DISCONTINUED | OUTPATIENT
Start: 2021-10-27 | End: 2021-10-27

## 2021-10-27 RX ORDER — DEXAMETHASONE SODIUM PHOSPHATE 4 MG/ML
VIAL (ML) INJECTION AS NEEDED
Status: DISCONTINUED | OUTPATIENT
Start: 2021-10-27 | End: 2021-10-27 | Stop reason: SURG

## 2021-10-27 RX ORDER — MIDAZOLAM HYDROCHLORIDE 1 MG/ML
1 INJECTION INTRAMUSCULAR; INTRAVENOUS EVERY 5 MIN PRN
Status: DISCONTINUED | OUTPATIENT
Start: 2021-10-27 | End: 2021-10-27

## 2021-10-27 RX ORDER — GLYCOPYRROLATE 0.2 MG/ML
INJECTION, SOLUTION INTRAMUSCULAR; INTRAVENOUS AS NEEDED
Status: DISCONTINUED | OUTPATIENT
Start: 2021-10-27 | End: 2021-10-27 | Stop reason: SURG

## 2021-10-27 RX ORDER — HYDROCODONE BITARTRATE AND ACETAMINOPHEN 5; 325 MG/1; MG/1
1 TABLET ORAL AS NEEDED
Status: COMPLETED | OUTPATIENT
Start: 2021-10-27 | End: 2021-10-27

## 2021-10-27 RX ORDER — MEPERIDINE HYDROCHLORIDE 25 MG/ML
12.5 INJECTION INTRAMUSCULAR; INTRAVENOUS; SUBCUTANEOUS AS NEEDED
Status: DISCONTINUED | OUTPATIENT
Start: 2021-10-27 | End: 2021-10-27

## 2021-10-27 RX ORDER — ONDANSETRON 2 MG/ML
INJECTION INTRAMUSCULAR; INTRAVENOUS AS NEEDED
Status: DISCONTINUED | OUTPATIENT
Start: 2021-10-27 | End: 2021-10-27 | Stop reason: SURG

## 2021-10-27 RX ADMIN — ROCURONIUM BROMIDE 50 MG: 10 INJECTION, SOLUTION INTRAVENOUS at 07:36:00

## 2021-10-27 RX ADMIN — GLYCOPYRROLATE 0.4 MG: 0.2 INJECTION, SOLUTION INTRAMUSCULAR; INTRAVENOUS at 09:50:00

## 2021-10-27 RX ADMIN — DEXAMETHASONE SODIUM PHOSPHATE 8 MG: 4 MG/ML VIAL (ML) INJECTION at 07:50:00

## 2021-10-27 RX ADMIN — NEOSTIGMINE METHYLSULFATE 2 MG: 1 INJECTION INTRAVENOUS at 09:50:00

## 2021-10-27 RX ADMIN — ONDANSETRON 4 MG: 2 INJECTION INTRAMUSCULAR; INTRAVENOUS at 09:43:00

## 2021-10-27 RX ADMIN — SODIUM CHLORIDE, SODIUM LACTATE, POTASSIUM CHLORIDE, CALCIUM CHLORIDE: 600; 310; 30; 20 INJECTION, SOLUTION INTRAVENOUS at 07:33:00

## 2021-10-27 RX ADMIN — CEFAZOLIN SODIUM/WATER 2 G: 2 G/20 ML SYRINGE (ML) INTRAVENOUS at 07:40:00

## 2021-10-27 RX ADMIN — MIDAZOLAM HYDROCHLORIDE 2 MG: 1 INJECTION INTRAMUSCULAR; INTRAVENOUS at 07:33:00

## 2021-10-27 NOTE — H&P
No change in H&P from 10/11. Plan for delayed bilateral breast reconstruction with tissue expander and acellular dermal matrix reviewed with patient.  The risks of surgery including but not limited to bleeding, infection, scarring, delayed wound healing, se

## 2021-10-27 NOTE — BRIEF OP NOTE
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

## 2021-10-27 NOTE — ANESTHESIA PROCEDURE NOTES
Airway  Date/Time: 10/27/2021 7:40 AM  Urgency: elective    Airway not difficult    General Information and Staff    Patient location during procedure: OR  Anesthesiologist: Christian Ramos MD  Performed: anesthesiologist     Indications and Patient C

## 2021-10-27 NOTE — ANESTHESIA PREPROCEDURE EVALUATION
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]    Delay breast reconstruction with placement of Bilateral tissue expanders and acellular shagufta then begin taking 75 mg daily thereafter (Patient taking differently: Take 37.5 mg (1 capsule) daily x 2 weeks then begin taking 75 mg daily thereafter    Will start after 10/27/21 surgery), Disp: 14 capsule, Rfl: 0  venlafaxine 75 MG Oral Capsule SR 24 Hr History    Tobacco Use      Smoking status: Former Smoker        Packs/day: 0.50        Years: 20.00        Pack years: 8        Quit date: 3/15/2021        Years since quittin.6      Smokeless tobacco: Never Used    Alcohol use:  Yes      Alcohol/week

## 2021-10-27 NOTE — ANESTHESIA POSTPROCEDURE EVALUATION
[address] [facility] Patient Status:  Hospital Outpatient Surgery   Age/Gender 42year old female MRN [id number]   [facility] SURGERY Attending Og Landers MD   Hosp Day # 0 PCP Rogers Drew MD       Anesthesia Post-op Note    Delay br

## 2021-10-27 NOTE — OPERATIVE REPORT
[facility]    PATIENT'S NAME: RENEE FARNSWORTH   ATTENDING PHYSICIAN: Alex Sultana M.D. OPERATING PHYSICIAN: Alex Sultana M.D.    PATIENT ACCOUNT#:   [de-identified]    LOCATION:  PRE[facility]  PRE [facility] 16 EDWP 10  MEDICAL RECORD #:   [id number]       HEMANTH Sequential compression devices were placed on bilateral lower extremities. Intravenous antibiotic prophylaxis was administered. The patient then underwent successful induction of general anesthesia and endotracheal intubation.   The arms were placed abduc suture. The wound was then closed in a layered fashion with interrupted 3-0 Vicryl deep dermal suture and running 4-0 Monocryl subcuticular suture.   Attention was turned to the left breast.  In a similar fashion, the inframammary fold incisions were recre

## 2021-10-29 ENCOUNTER — TELEPHONE (OUTPATIENT)
Dept: SURGERY | Facility: CLINIC | Age: 46
End: 2021-10-29

## 2021-10-29 NOTE — TELEPHONE ENCOUNTER
Patient called with concerns of surgical pain and slight asymmetry of her left reconstructed breast.  Patient underwent bilateral nipple sparing mastectomy and tissue expander placement 2 days ago by Dr. Lindsey Shore. No intraoperative air fill.    Today she for

## 2021-11-08 ENCOUNTER — OFFICE VISIT (OUTPATIENT)
Dept: SURGERY | Facility: CLINIC | Age: 46
End: 2021-11-08
Payer: COMMERCIAL

## 2021-11-08 DIAGNOSIS — Z90.13 ABSENCE OF BREAST, BILATERAL: Primary | ICD-10-CM

## 2021-11-08 PROCEDURE — 99024 POSTOP FOLLOW-UP VISIT: CPT | Performed by: PHYSICIAN ASSISTANT

## 2021-11-08 NOTE — PROGRESS NOTES
Meir Cuevas is a 42year old female who presents today for a follow-up after bilateral delayed breast reconstruction with tissue expander and ADM, prepectoral placement, no intraoperative air fill with Dr. Lindsey Shore on 10/27/2021. She denies fever and chills.

## 2021-11-16 ENCOUNTER — OFFICE VISIT (OUTPATIENT)
Dept: SURGERY | Facility: CLINIC | Age: 46
End: 2021-11-16
Payer: COMMERCIAL

## 2021-11-16 DIAGNOSIS — Z90.13 ABSENCE OF BREAST, BILATERAL: Primary | ICD-10-CM

## 2021-11-16 PROCEDURE — 99024 POSTOP FOLLOW-UP VISIT: CPT | Performed by: SURGERY

## 2021-11-16 NOTE — PROGRESS NOTES
Elsa Quiroz is a 42year old female who presents today for a follow-up. She denies fever and chills. She denies nausea, vomiting, diarrhea or constipation. Physical Examination:  Breasts: Bilateral breast incisions are clean dry and intact.   Procedure

## 2021-11-22 ENCOUNTER — OFFICE VISIT (OUTPATIENT)
Dept: SURGERY | Facility: CLINIC | Age: 46
End: 2021-11-22
Payer: COMMERCIAL

## 2021-11-22 DIAGNOSIS — Z90.13 ABSENCE OF BREAST, BILATERAL: Primary | ICD-10-CM

## 2021-11-22 PROCEDURE — 99024 POSTOP FOLLOW-UP VISIT: CPT | Performed by: PHYSICIAN ASSISTANT

## 2021-11-22 NOTE — PROGRESS NOTES
This is a 29-year-old female that is 3 and half weeks status post her bilateral delayed breast reconstruction with tissue expanders and AlloDerm ADM.   She previously underwent bilateral mastectomy without immediate reconstruction and after she healed and h

## 2021-11-29 ENCOUNTER — OFFICE VISIT (OUTPATIENT)
Dept: SURGERY | Facility: CLINIC | Age: 46
End: 2021-11-29
Payer: COMMERCIAL

## 2021-11-29 DIAGNOSIS — Z90.13 ABSENCE OF BREAST, BILATERAL: Primary | ICD-10-CM

## 2021-11-29 PROCEDURE — 99024 POSTOP FOLLOW-UP VISIT: CPT | Performed by: PHYSICIAN ASSISTANT

## 2021-11-29 NOTE — PROGRESS NOTES
Janie Rich is a 42year old female who presents today for a follow-up after bilateral delayed breast reconstruction with tissue expander and ADM, prepectoral placement, no intraoperative air fill with Dr. Lelo Drew on 10/27/2021. She denies fever and chills.

## 2021-12-06 ENCOUNTER — OFFICE VISIT (OUTPATIENT)
Dept: SURGERY | Facility: CLINIC | Age: 46
End: 2021-12-06
Payer: COMMERCIAL

## 2021-12-06 DIAGNOSIS — Z90.13 ABSENCE OF BREAST, BILATERAL: Primary | ICD-10-CM

## 2021-12-06 PROCEDURE — 99024 POSTOP FOLLOW-UP VISIT: CPT | Performed by: PHYSICIAN ASSISTANT

## 2021-12-06 NOTE — PROGRESS NOTES
This is a 68-year-old female that is 5 and half weeks status post her bilateral delayed breast reconstruction with tissue expanders and AlloDerm ADM.   She previously underwent bilateral mastectomy without immediate reconstruction and after she healed and h

## 2021-12-13 ENCOUNTER — OFFICE VISIT (OUTPATIENT)
Dept: SURGERY | Facility: CLINIC | Age: 46
End: 2021-12-13
Payer: COMMERCIAL

## 2021-12-13 DIAGNOSIS — Z90.13 ABSENCE OF BREAST, BILATERAL: Primary | ICD-10-CM

## 2021-12-13 PROCEDURE — 99024 POSTOP FOLLOW-UP VISIT: CPT | Performed by: PHYSICIAN ASSISTANT

## 2021-12-13 NOTE — PROGRESS NOTES
This is a 44-year-old female that is 6 and half weeks status post her bilateral delayed breast reconstruction with tissue expanders and AlloDerm ADM. Chachaadilia Xavier previously underwent bilateral mastectomy without immediate reconstruction and after she healed and h

## 2021-12-23 ENCOUNTER — NURSE ONLY (OUTPATIENT)
Dept: SURGERY | Facility: CLINIC | Age: 46
End: 2021-12-23
Payer: COMMERCIAL

## 2021-12-23 NOTE — PROGRESS NOTES
S/p bilateral delayed breast reconstruction with tissue expander and ADM, prepectoral placement, no intraoperative air fill with Dr. Kulwinder Davis on 10/27/2021    The patient came in today for a nurse visit for final bilateral breast tissue expansion.  Following

## 2022-01-11 ENCOUNTER — OFFICE VISIT (OUTPATIENT)
Dept: SURGERY | Facility: CLINIC | Age: 47
End: 2022-01-11
Payer: COMMERCIAL

## 2022-01-11 DIAGNOSIS — Z90.13 ABSENCE OF BREAST, BILATERAL: Primary | ICD-10-CM

## 2022-01-11 PROCEDURE — 99024 POSTOP FOLLOW-UP VISIT: CPT | Performed by: SURGERY

## 2022-01-11 NOTE — PROGRESS NOTES
Monroe Munoz is a 42year old female who presents today for a follow-up. She has completed expansion and wishes to proceed with a second stage of her reconstruction. She currently has a 12 cm base diameter tissue expander filled to 420 cc.     Physical Exa

## 2022-01-11 NOTE — PATIENT INSTRUCTIONS
Surgeon:         Dr. Shaista Ramirez                                        Tel:        [phone]                                  Fax:        [phone]    Surgery/Procedure:      Removal of bilateral tissue expanders, placement of permanent silicone im ___________________________________________.     Dr. Ferguson Back to do surgical clearance H&P  CBC,CMP required  Informed consent and photos done 1/11/22

## 2022-01-21 ENCOUNTER — TELEPHONE (OUTPATIENT)
Dept: SURGERY | Facility: CLINIC | Age: 47
End: 2022-01-21

## 2022-01-21 ENCOUNTER — OFFICE VISIT (OUTPATIENT)
Dept: SURGERY | Facility: CLINIC | Age: 47
End: 2022-01-21
Payer: COMMERCIAL

## 2022-01-21 DIAGNOSIS — Z90.13 ABSENCE OF BREAST, BILATERAL: Primary | ICD-10-CM

## 2022-01-21 PROCEDURE — 99024 POSTOP FOLLOW-UP VISIT: CPT | Performed by: SURGERY

## 2022-01-21 NOTE — TELEPHONE ENCOUNTER
Patient LVM stating her nipple was turning black and had surrounding redness. She was called back and asked to send a photo for further evaluation.  After Dr. Sagar Chan reviewed her photos, it was agreed the patient will come to the [place] office to see Dr. Lucy Navas

## 2022-01-21 NOTE — PROGRESS NOTES
Karie Lawton is a 42year old female who presents today for a follow-up after bilateral delayed breast reconstruction with tissue expander and ADM, prepectoral placement, no intraoperative air fill with Dr. Jo Horton on 10/27/2021. She denies fever and chills. instructed to call with any questions or concerns and to monitor for any worsening signs or symptoms. Questions were answered. Patient understands.

## 2022-01-28 ENCOUNTER — OFFICE VISIT (OUTPATIENT)
Dept: SURGERY | Facility: CLINIC | Age: 47
End: 2022-01-28
Payer: COMMERCIAL

## 2022-01-28 DIAGNOSIS — Z90.13 ABSENCE OF BREAST, BILATERAL: Primary | ICD-10-CM

## 2022-01-28 DIAGNOSIS — D05.12 DUCTAL CARCINOMA IN SITU (DCIS) OF LEFT BREAST: Primary | ICD-10-CM

## 2022-01-28 PROCEDURE — 99024 POSTOP FOLLOW-UP VISIT: CPT | Performed by: SURGERY

## 2022-01-28 NOTE — PATIENT INSTRUCTIONS
Surgeon:         Dr. Faizan Wise                                        Tel:         [phone]                                  Fax:        [phone]    Surgery/Procedure:            Left nipple debridement. Local anesthesia, outpatient, 45 min.

## 2022-01-28 NOTE — PROGRESS NOTES
Catalino Neely is a 41year old female who presents today for a follow-up. Last week, she presented to Dr. Jonna Hoyt for evaluation of a new finding of left nipple necrosis. She underwent partial deflation of her expander at that time.   She has been applying top

## 2022-02-02 ENCOUNTER — HOSPITAL ENCOUNTER (OUTPATIENT)
Facility: HOSPITAL | Age: 47
Setting detail: HOSPITAL OUTPATIENT SURGERY
Discharge: HOME | End: 2022-02-22
Attending: SURGERY | Admitting: SURGERY
Payer: COMMERCIAL

## 2022-02-02 VITALS
BODY MASS INDEX: 18.96 KG/M2 | WEIGHT: 107 LBS | DIASTOLIC BLOOD PRESSURE: 64 MMHG | OXYGEN SATURATION: 100 % | HEART RATE: 85 BPM | RESPIRATION RATE: 18 BRPM | HEIGHT: 63 IN | SYSTOLIC BLOOD PRESSURE: 118 MMHG

## 2022-02-02 DIAGNOSIS — D05.12 DUCTAL CARCINOMA IN SITU (DCIS) OF LEFT BREAST: ICD-10-CM

## 2022-02-02 PROCEDURE — 0HBXXZZ EXCISION OF LEFT NIPPLE, EXTERNAL APPROACH: ICD-10-PCS | Performed by: SURGERY

## 2022-02-02 PROCEDURE — 88305 TISSUE EXAM BY PATHOLOGIST: CPT | Performed by: SURGERY

## 2022-02-02 RX ORDER — LIDOCAINE HYDROCHLORIDE AND EPINEPHRINE 10; 10 MG/ML; UG/ML
INJECTION, SOLUTION INFILTRATION; PERINEURAL AS NEEDED
Status: DISCONTINUED | OUTPATIENT
Start: 2022-02-02 | End: 2022-02-24

## 2022-02-02 NOTE — BRIEF OP NOTE
Pre-Operative Diagnosis: Ductal carcinoma in situ (DCIS) of left breast [D05.12]     Post-Operative Diagnosis: * No post-op diagnosis entered *      Procedure Performed:   Left nipple debridement    Surgeon(s) and Role:     Osvaldo Sanders MD - Primary    Assistant(s):        Surgical Findings: as dictated     Specimen: none     Estimated Blood Loss: 1cc      Main Maharaj PA-C  2/2/2022  3:38 PM

## 2022-02-03 NOTE — OPERATIVE REPORT
[facility]    PATIENT'S NAME: RENEE FARNSWORTH   ATTENDING PHYSICIAN: Alex Escobar M.D. OPERATING PHYSICIAN: Alex Escobar M.D. PATIENT ACCOUNT#:   [de-identified]    LOCATION:  [facility] OR [address] #:   [id number]       YOB: 1975  ADMISSION DATE:       02/02/2022      OPERATION DATE:  02/02/2022    OPERATIVE REPORT      PREOPERATIVE DIAGNOSIS:  Left nipple necrosis. POSTOPERATIVE DIAGNOSIS:  Left nipple necrosis. PROCEDURE:  Excision of left nipple, excised diameter 1 cm and layered closure totaling 2 cm. ASSISTANT:  None. ANESTHESIA:  Local.    ESTIMATED BLOOD LOSS:  Trace. COMPLICATIONS:  None. INDICATIONS:  Patient is a 37-year-old female who underwent bilateral mastectomy followed by delayed tissue expander reconstruction. She developed left nipple necrosis during expansion and now requires debridement and re-closure. OPERATIVE TECHNIQUE:  Informed consent was obtained from the patient. The risks, benefits, and alternatives were reviewed with the patient preoperatively. She expressed understanding and wished to proceed. The patient was taken to the procedure room, properly identified, and placed in a supine position. The areas of necrosis were encompassed in an ellipse. The area was then infiltrated with 3 mL of 1% lidocaine with epinephrine. The breast was then prepped and draped sterilely. The tissue expander was accessed, and 50 mL of saline was aspirated to take tension off the closure. The ellipse was then excised and sent for permanent pathologic analysis. Subcutaneous flaps were then elevated sharply. The wound was repaired in a layered fashion with interrupted 3-0 Vicryl deep sutures and interrupted 5-0 Prolene simple suture. Bacitracin, Xeroform, gauze, and Tegaderm were applied. The patient tolerated the procedure well. There were no intraoperative complications. Dictated By Ashley Escobar M.D.  d: 02/02/2022 15:52:01  t: 02/02/2022 21:39:24  Tracy Pearce [id number]  OMERY/

## 2022-02-07 ENCOUNTER — TELEPHONE (OUTPATIENT)
Dept: SURGERY | Facility: CLINIC | Age: 47
End: 2022-02-07

## 2022-02-07 NOTE — TELEPHONE ENCOUNTER
Patient called to ask if she is ok to shower normally since her surgery on Wednesday. Her discharge instructions were reviewed and she was instructed to shower normally and reapply neosporin after. She was appreciative of the call.

## 2022-02-14 ENCOUNTER — OFFICE VISIT (OUTPATIENT)
Dept: SURGERY | Facility: CLINIC | Age: 47
End: 2022-02-14
Payer: COMMERCIAL

## 2022-02-14 ENCOUNTER — SOCIAL WORK SERVICES (OUTPATIENT)
Dept: HEMATOLOGY/ONCOLOGY | Facility: HOSPITAL | Age: 47
End: 2022-02-14

## 2022-02-14 DIAGNOSIS — Z90.13 ABSENCE OF BREAST, BILATERAL: Primary | ICD-10-CM

## 2022-02-14 PROCEDURE — 99024 POSTOP FOLLOW-UP VISIT: CPT | Performed by: PHYSICIAN ASSISTANT

## 2022-02-14 NOTE — PROGRESS NOTES
spoke with patient and completed ADA Medical Assessment Form for Int Leave; faxing to [employer] at G#[phone] and sent a copy to patient via [address]

## 2022-02-14 NOTE — PROGRESS NOTES
This is a 29-year-old female that is 12 days status post her excision of the left nipple. She previously underwent bilateral mastectomy and delayed tissue expander reconstruction. Currently she is on a left-sided total of 250 cc of saline and a right-sided total of 420 cc of saline. She developed left nipple necrosis during expansion and therefore required debridement and reclosure. This was done under local anesthesia. She is here today for wound check and suture removal.  Denies fevers or signs of infection. Denies any issues or acute events since surgery. Exam:  Right breast incision remains well-healed  Left breast nipple excision incision c/d/i prolene sutures intact. No evidence of infection, cellulitis, wound separation, or necrosis  Bilateral expanders noted to be in place with the left slightly deflated as compared to the right. Impression:  12 days status post her excision of the left nipple    16 weeks status post her bilateral delayed breast reconstruction with tissue expanders and AlloDerm ADM    Plan:  The incision is well-healed and the sutures were removed today. She tolerated this well. Neosporin will be applied twice daily. She will continue compression. She will follow-up next week and as long as the incision remains well-healed we will start reexpanding her to be equal to the other side. We will then plan the second stage implant reconstruction following her expansion.

## 2022-02-22 ENCOUNTER — OFFICE VISIT (OUTPATIENT)
Dept: SURGERY | Facility: CLINIC | Age: 47
End: 2022-02-22
Payer: COMMERCIAL

## 2022-02-22 DIAGNOSIS — Z90.13 ABSENCE OF BREAST, BILATERAL: Primary | ICD-10-CM

## 2022-02-22 PROCEDURE — 99024 POSTOP FOLLOW-UP VISIT: CPT | Performed by: SURGERY

## 2022-02-22 NOTE — PROGRESS NOTES
Torrey Humphreys is a 41year old female who presents today for a follow-up. Physical Examination:  Breasts: Left breast incisions are clean dry and intact. Procedure: The left breast was sterilely expanded with 30 cc of saline with total of 280 cc. The right breast remains at 420 cc. Assessment and Plan:  Patient is doing well. She will follow-up in 1 week for further expansion.

## 2022-03-04 ENCOUNTER — OFFICE VISIT (OUTPATIENT)
Dept: SURGERY | Facility: CLINIC | Age: 47
End: 2022-03-04
Payer: COMMERCIAL

## 2022-03-04 DIAGNOSIS — Z90.13 ABSENCE OF BREAST, BILATERAL: Primary | ICD-10-CM

## 2022-03-04 PROCEDURE — 99024 POSTOP FOLLOW-UP VISIT: CPT | Performed by: PHYSICIAN ASSISTANT

## 2022-03-11 ENCOUNTER — OFFICE VISIT (OUTPATIENT)
Dept: SURGERY | Facility: CLINIC | Age: 47
End: 2022-03-11
Payer: COMMERCIAL

## 2022-03-11 DIAGNOSIS — Z90.13 ABSENCE OF BREAST, BILATERAL: Primary | ICD-10-CM

## 2022-03-11 PROCEDURE — 99024 POSTOP FOLLOW-UP VISIT: CPT | Performed by: PHYSICIAN ASSISTANT

## 2022-03-25 ENCOUNTER — OFFICE VISIT (OUTPATIENT)
Dept: SURGERY | Facility: CLINIC | Age: 47
End: 2022-03-25
Payer: COMMERCIAL

## 2022-03-25 DIAGNOSIS — Z90.13 ABSENCE OF BREAST, BILATERAL: Primary | ICD-10-CM

## 2022-03-25 PROCEDURE — 99024 POSTOP FOLLOW-UP VISIT: CPT | Performed by: PHYSICIAN ASSISTANT

## 2022-04-01 ENCOUNTER — OFFICE VISIT (OUTPATIENT)
Dept: SURGERY | Facility: CLINIC | Age: 47
End: 2022-04-01
Payer: COMMERCIAL

## 2022-04-01 DIAGNOSIS — Z90.13 ABSENCE OF BREAST, BILATERAL: Primary | ICD-10-CM

## 2022-04-01 PROCEDURE — 99024 POSTOP FOLLOW-UP VISIT: CPT | Performed by: PHYSICIAN ASSISTANT

## 2022-04-19 ENCOUNTER — OFFICE VISIT (OUTPATIENT)
Dept: SURGERY | Facility: CLINIC | Age: 47
End: 2022-04-19
Payer: COMMERCIAL

## 2022-04-19 DIAGNOSIS — Z90.13 ABSENCE OF BREAST, BILATERAL: Primary | ICD-10-CM

## 2022-04-19 NOTE — PROGRESS NOTES
Brenda Hurley is a 41year old female who presents today for a follow-up. She currently has a 12 cm base diameter tissue expander filled to 420 cc. She would like to proceed with the second stage of her reconstruction. Physical Examination:  Breasts: Bilateral breast incisions are well-healed. The posterior subcutaneous fat is noted throughout. Hollowing is noted in the upper poles of bilateral breast.  Abdomen: Well-healed laparoscopic port sites are noted. There are no palpable hernias noted. Assessment and Plan:  We discussed the plan for the second stage which will include removal of bilateral breast tissue expanders, placement of smooth, round, silicone implants, and fat grafting to bilateral reconstructed breasts. The nature of the procedure was reviewed with the patient. We discussed the risks of surgery including but not limited to bleeding, infection, scarring, delayed wound healing, asymmetry, implant infection or extrusion requiring removal, ALCL, capsular contracture, hypertrophic scarring or keloid, injury to intra-abdominal structures, contour abnormalities, cysts or calcifications requiring biopsy, and need for further surgery. We reviewed the expected postoperative course including possible need for drains, as well as need for activity limitation and compression. Multiple questions were answered the patient's satisfaction. No guarantees as to outcome were offered. The patient expresses understanding and wishes to proceed.

## 2022-05-03 ENCOUNTER — TELEPHONE (OUTPATIENT)
Dept: SURGERY | Facility: CLINIC | Age: 47
End: 2022-05-03

## 2022-05-03 NOTE — TELEPHONE ENCOUNTER
Patient was called and offered surgery Date 8/31/2022 @ [facility]. Patient excepted. Pt reminded to complete medical clearance. Pt verbalized understanding.

## 2022-05-06 ENCOUNTER — OFFICE VISIT (OUTPATIENT)
Dept: SURGERY | Facility: CLINIC | Age: 47
End: 2022-05-06
Payer: COMMERCIAL

## 2022-05-06 VITALS
OXYGEN SATURATION: 96 % | RESPIRATION RATE: 16 BRPM | HEART RATE: 96 BPM | BODY MASS INDEX: 17.48 KG/M2 | HEIGHT: 63 IN | SYSTOLIC BLOOD PRESSURE: 121 MMHG | WEIGHT: 98.63 LBS | DIASTOLIC BLOOD PRESSURE: 81 MMHG

## 2022-05-06 DIAGNOSIS — Z90.13 ABSENCE OF BREAST, BILATERAL: ICD-10-CM

## 2022-05-06 DIAGNOSIS — D05.12 DUCTAL CARCINOMA IN SITU (DCIS) OF LEFT BREAST: Primary | ICD-10-CM

## 2022-05-06 PROCEDURE — 3079F DIAST BP 80-89 MM HG: CPT | Performed by: SURGERY

## 2022-05-06 PROCEDURE — 3074F SYST BP LT 130 MM HG: CPT | Performed by: SURGERY

## 2022-05-06 PROCEDURE — 3008F BODY MASS INDEX DOCD: CPT | Performed by: SURGERY

## 2022-05-06 PROCEDURE — 99213 OFFICE O/P EST LOW 20 MIN: CPT | Performed by: SURGERY

## 2022-05-31 ENCOUNTER — OFFICE VISIT (OUTPATIENT)
Dept: SURGERY | Facility: CLINIC | Age: 47
End: 2022-05-31

## 2022-05-31 DIAGNOSIS — L98.8 RHYTIDES: Primary | ICD-10-CM

## 2022-05-31 NOTE — PROCEDURES
Patient presents and wishes undergo Botox treatments to her forehead, glabella, and periocular region. The risk, benefits, and alternatives were reviewed with the patient. She expressed understanding wish to proceed. 32 units of Botox (lot  C2, expiration 10/2024) were injected as per the attached diagram.  Aftercare instructions were reviewed with patient. Questions were answered. The patient tolerated procedure well.

## 2022-07-27 ENCOUNTER — TELEPHONE (OUTPATIENT)
Dept: SURGERY | Facility: CLINIC | Age: 47
End: 2022-07-27

## 2022-07-27 NOTE — TELEPHONE ENCOUNTER
Patient called to discuss time off of work following upcoming surgery. We discussed that patient should be off for 2-3 weeks since her job does not require heavy lifting. Patient will send FMLA.

## 2022-08-16 ENCOUNTER — TELEPHONE (OUTPATIENT)
Dept: SURGERY | Facility: CLINIC | Age: 47
End: 2022-08-16

## 2022-08-16 NOTE — TELEPHONE ENCOUNTER
Patient LVM requesting call back regarding her FMLA. LVM stating her FMLA will be sent to social workers today and to call us back with any other questions or concerns.

## 2022-08-17 ENCOUNTER — SOCIAL WORK SERVICES (OUTPATIENT)
Dept: HEMATOLOGY/ONCOLOGY | Facility: HOSPITAL | Age: 47
End: 2022-08-17

## 2022-08-17 NOTE — PROGRESS NOTES
completed [facility], faxing to [employer] at X#[phone] and sending copy to patient via [address]

## 2022-08-29 ENCOUNTER — LAB ENCOUNTER (OUTPATIENT)
Dept: LAB | Age: 47
End: 2022-08-29
Attending: SURGERY
Payer: COMMERCIAL

## 2022-08-29 DIAGNOSIS — Z01.812 ENCOUNTER FOR PREPROCEDURE SCREENING LABORATORY TESTING FOR COVID-19: ICD-10-CM

## 2022-08-29 DIAGNOSIS — Z20.822 ENCOUNTER FOR PREPROCEDURE SCREENING LABORATORY TESTING FOR COVID-19: ICD-10-CM

## 2022-08-30 LAB — SARS-COV-2 RNA RESP QL NAA+PROBE: NOT DETECTED

## 2022-08-31 ENCOUNTER — ANESTHESIA (OUTPATIENT)
Dept: SURGERY | Facility: HOSPITAL | Age: 47
End: 2022-08-31
Payer: COMMERCIAL

## 2022-08-31 ENCOUNTER — HOSPITAL ENCOUNTER (OUTPATIENT)
Facility: HOSPITAL | Age: 47
Setting detail: HOSPITAL OUTPATIENT SURGERY
Discharge: HOME | End: 2022-08-31
Attending: SURGERY | Admitting: SURGERY
Payer: COMMERCIAL

## 2022-08-31 ENCOUNTER — ANESTHESIA EVENT (OUTPATIENT)
Dept: SURGERY | Facility: HOSPITAL | Age: 47
End: 2022-08-31
Payer: COMMERCIAL

## 2022-08-31 VITALS
DIASTOLIC BLOOD PRESSURE: 65 MMHG | HEIGHT: 63 IN | OXYGEN SATURATION: 95 % | RESPIRATION RATE: 18 BRPM | TEMPERATURE: 98 F | BODY MASS INDEX: 17.3 KG/M2 | SYSTOLIC BLOOD PRESSURE: 107 MMHG | HEART RATE: 66 BPM | WEIGHT: 97.63 LBS

## 2022-08-31 DIAGNOSIS — Z90.13 ABSENCE OF BREAST, BILATERAL: ICD-10-CM

## 2022-08-31 DIAGNOSIS — Z01.812 ENCOUNTER FOR PREPROCEDURE SCREENING LABORATORY TESTING FOR COVID-19: Primary | ICD-10-CM

## 2022-08-31 DIAGNOSIS — Z20.822 ENCOUNTER FOR PREPROCEDURE SCREENING LABORATORY TESTING FOR COVID-19: Primary | ICD-10-CM

## 2022-08-31 DIAGNOSIS — D05.12 DUCTAL CARCINOMA IN SITU (DCIS) OF LEFT BREAST: ICD-10-CM

## 2022-08-31 DIAGNOSIS — D05.12 BREAST NEOPLASM, TIS (DCIS), LEFT: ICD-10-CM

## 2022-08-31 PROCEDURE — 0HRV0JZ REPLACEMENT OF BILATERAL BREAST WITH SYNTHETIC SUBSTITUTE, OPEN APPROACH: ICD-10-PCS | Performed by: SURGERY

## 2022-08-31 PROCEDURE — 0HPU0NZ REMOVAL OF TISSUE EXPANDER FROM LEFT BREAST, OPEN APPROACH: ICD-10-PCS | Performed by: SURGERY

## 2022-08-31 PROCEDURE — 0JD83ZZ EXTRACTION OF ABDOMEN SUBCUTANEOUS TISSUE AND FASCIA, PERCUTANEOUS APPROACH: ICD-10-PCS | Performed by: SURGERY

## 2022-08-31 PROCEDURE — 0HPT0NZ REMOVAL OF TISSUE EXPANDER FROM RIGHT BREAST, OPEN APPROACH: ICD-10-PCS | Performed by: SURGERY

## 2022-08-31 DEVICE — SMOOTH MODERATE HIGH PROFILE XTRA, 430 CC  SMOOTH ROUND SILICONE
Type: IMPLANTABLE DEVICE | Site: BREAST | Status: FUNCTIONAL
Brand: MENTOR MEMORYGEL XTRA BREAST IMPLANT

## 2022-08-31 RX ORDER — LIDOCAINE HYDROCHLORIDE 10 MG/ML
INJECTION, SOLUTION EPIDURAL; INFILTRATION; INTRACAUDAL; PERINEURAL AS NEEDED
Status: DISCONTINUED | OUTPATIENT
Start: 2022-08-31 | End: 2022-08-31 | Stop reason: SURG

## 2022-08-31 RX ORDER — MIDAZOLAM HYDROCHLORIDE 1 MG/ML
1 INJECTION INTRAMUSCULAR; INTRAVENOUS EVERY 5 MIN PRN
Status: DISCONTINUED | OUTPATIENT
Start: 2022-08-31 | End: 2022-08-31

## 2022-08-31 RX ORDER — HYDROMORPHONE HYDROCHLORIDE 1 MG/ML
0.6 INJECTION, SOLUTION INTRAMUSCULAR; INTRAVENOUS; SUBCUTANEOUS EVERY 5 MIN PRN
Status: DISCONTINUED | OUTPATIENT
Start: 2022-08-31 | End: 2022-08-31

## 2022-08-31 RX ORDER — HYDROCODONE BITARTRATE AND ACETAMINOPHEN 5; 325 MG/1; MG/1
1-2 TABLET ORAL EVERY 4 HOURS PRN
Qty: 20 TABLET | Refills: 0 | Status: SHIPPED | OUTPATIENT
Start: 2022-08-31

## 2022-08-31 RX ORDER — LIDOCAINE HYDROCHLORIDE AND EPINEPHRINE 10; 10 MG/ML; UG/ML
INJECTION, SOLUTION INFILTRATION; PERINEURAL AS NEEDED
Status: DISCONTINUED | OUTPATIENT
Start: 2022-08-31 | End: 2022-08-31 | Stop reason: HOSPADM

## 2022-08-31 RX ORDER — HYDROMORPHONE HYDROCHLORIDE 1 MG/ML
0.2 INJECTION, SOLUTION INTRAMUSCULAR; INTRAVENOUS; SUBCUTANEOUS EVERY 5 MIN PRN
Status: DISCONTINUED | OUTPATIENT
Start: 2022-08-31 | End: 2022-08-31

## 2022-08-31 RX ORDER — ONDANSETRON 2 MG/ML
INJECTION INTRAMUSCULAR; INTRAVENOUS AS NEEDED
Status: DISCONTINUED | OUTPATIENT
Start: 2022-08-31 | End: 2022-08-31 | Stop reason: SURG

## 2022-08-31 RX ORDER — ONDANSETRON 2 MG/ML
4 INJECTION INTRAMUSCULAR; INTRAVENOUS EVERY 6 HOURS PRN
Status: DISCONTINUED | OUTPATIENT
Start: 2022-08-31 | End: 2022-08-31

## 2022-08-31 RX ORDER — METOPROLOL TARTRATE 5 MG/5ML
2.5 INJECTION INTRAVENOUS ONCE
Status: DISCONTINUED | OUTPATIENT
Start: 2022-08-31 | End: 2022-08-31

## 2022-08-31 RX ORDER — DEXAMETHASONE SODIUM PHOSPHATE 4 MG/ML
VIAL (ML) INJECTION AS NEEDED
Status: DISCONTINUED | OUTPATIENT
Start: 2022-08-31 | End: 2022-08-31 | Stop reason: SURG

## 2022-08-31 RX ORDER — GLYCOPYRROLATE 0.2 MG/ML
INJECTION, SOLUTION INTRAMUSCULAR; INTRAVENOUS AS NEEDED
Status: DISCONTINUED | OUTPATIENT
Start: 2022-08-31 | End: 2022-08-31 | Stop reason: SURG

## 2022-08-31 RX ORDER — ONDANSETRON 4 MG/1
4 TABLET, FILM COATED ORAL EVERY 8 HOURS PRN
Qty: 12 TABLET | Refills: 0 | Status: SHIPPED | OUTPATIENT
Start: 2022-08-31

## 2022-08-31 RX ORDER — LABETALOL HYDROCHLORIDE 5 MG/ML
5 INJECTION, SOLUTION INTRAVENOUS EVERY 5 MIN PRN
Status: DISCONTINUED | OUTPATIENT
Start: 2022-08-31 | End: 2022-08-31

## 2022-08-31 RX ORDER — ACETAMINOPHEN 500 MG
1000 TABLET ORAL ONCE
Status: DISCONTINUED | OUTPATIENT
Start: 2022-08-31 | End: 2022-08-31 | Stop reason: HOSPADM

## 2022-08-31 RX ORDER — HYDROMORPHONE HYDROCHLORIDE 1 MG/ML
0.4 INJECTION, SOLUTION INTRAMUSCULAR; INTRAVENOUS; SUBCUTANEOUS EVERY 5 MIN PRN
Status: DISCONTINUED | OUTPATIENT
Start: 2022-08-31 | End: 2022-08-31

## 2022-08-31 RX ORDER — ZOLPIDEM TARTRATE 5 MG/1
1 TABLET ORAL NIGHTLY PRN
COMMUNITY
Start: 2022-08-17

## 2022-08-31 RX ORDER — MEPERIDINE HYDROCHLORIDE 25 MG/ML
12.5 INJECTION INTRAMUSCULAR; INTRAVENOUS; SUBCUTANEOUS AS NEEDED
Status: DISCONTINUED | OUTPATIENT
Start: 2022-08-31 | End: 2022-08-31

## 2022-08-31 RX ORDER — ACETAMINOPHEN 500 MG
1000 TABLET ORAL ONCE AS NEEDED
Status: COMPLETED | OUTPATIENT
Start: 2022-08-31 | End: 2022-08-31

## 2022-08-31 RX ORDER — HYDROMORPHONE HYDROCHLORIDE 1 MG/ML
INJECTION, SOLUTION INTRAMUSCULAR; INTRAVENOUS; SUBCUTANEOUS
Status: COMPLETED
Start: 2022-08-31 | End: 2022-08-31

## 2022-08-31 RX ORDER — PROCHLORPERAZINE EDISYLATE 5 MG/ML
5 INJECTION INTRAMUSCULAR; INTRAVENOUS EVERY 8 HOURS PRN
Status: DISCONTINUED | OUTPATIENT
Start: 2022-08-31 | End: 2022-08-31

## 2022-08-31 RX ORDER — MIDAZOLAM HYDROCHLORIDE 1 MG/ML
INJECTION INTRAMUSCULAR; INTRAVENOUS AS NEEDED
Status: DISCONTINUED | OUTPATIENT
Start: 2022-08-31 | End: 2022-08-31 | Stop reason: SURG

## 2022-08-31 RX ORDER — NALOXONE HYDROCHLORIDE 0.4 MG/ML
80 INJECTION, SOLUTION INTRAMUSCULAR; INTRAVENOUS; SUBCUTANEOUS AS NEEDED
Status: DISCONTINUED | OUTPATIENT
Start: 2022-08-31 | End: 2022-08-31

## 2022-08-31 RX ORDER — ROCURONIUM BROMIDE 10 MG/ML
INJECTION, SOLUTION INTRAVENOUS AS NEEDED
Status: DISCONTINUED | OUTPATIENT
Start: 2022-08-31 | End: 2022-08-31 | Stop reason: SURG

## 2022-08-31 RX ORDER — SODIUM CHLORIDE, SODIUM LACTATE, POTASSIUM CHLORIDE, CALCIUM CHLORIDE 600; 310; 30; 20 MG/100ML; MG/100ML; MG/100ML; MG/100ML
INJECTION, SOLUTION INTRAVENOUS CONTINUOUS
Status: DISCONTINUED | OUTPATIENT
Start: 2022-08-31 | End: 2022-08-31

## 2022-08-31 RX ORDER — NEOSTIGMINE METHYLSULFATE 1 MG/ML
INJECTION, SOLUTION INTRAVENOUS AS NEEDED
Status: DISCONTINUED | OUTPATIENT
Start: 2022-08-31 | End: 2022-08-31 | Stop reason: SURG

## 2022-08-31 RX ORDER — HYDROCODONE BITARTRATE AND ACETAMINOPHEN 5; 325 MG/1; MG/1
2 TABLET ORAL ONCE AS NEEDED
Status: COMPLETED | OUTPATIENT
Start: 2022-08-31 | End: 2022-08-31

## 2022-08-31 RX ORDER — SCOLOPAMINE TRANSDERMAL SYSTEM 1 MG/1
1 PATCH, EXTENDED RELEASE TRANSDERMAL ONCE
Status: DISCONTINUED | OUTPATIENT
Start: 2022-08-31 | End: 2022-08-31 | Stop reason: HOSPADM

## 2022-08-31 RX ORDER — METOCLOPRAMIDE HYDROCHLORIDE 5 MG/ML
INJECTION INTRAMUSCULAR; INTRAVENOUS AS NEEDED
Status: DISCONTINUED | OUTPATIENT
Start: 2022-08-31 | End: 2022-08-31 | Stop reason: SURG

## 2022-08-31 RX ORDER — CEPHALEXIN 500 MG/1
500 CAPSULE ORAL 4 TIMES DAILY
Qty: 20 CAPSULE | Refills: 0 | Status: SHIPPED | OUTPATIENT
Start: 2022-08-31

## 2022-08-31 RX ORDER — CEFAZOLIN SODIUM/WATER 2 G/20 ML
2 SYRINGE (ML) INTRAVENOUS ONCE
Status: COMPLETED | OUTPATIENT
Start: 2022-08-31 | End: 2022-08-31

## 2022-08-31 RX ORDER — HYDROCODONE BITARTRATE AND ACETAMINOPHEN 5; 325 MG/1; MG/1
1 TABLET ORAL ONCE AS NEEDED
Status: COMPLETED | OUTPATIENT
Start: 2022-08-31 | End: 2022-08-31

## 2022-08-31 RX ORDER — DOCUSATE SODIUM 100 MG/1
100 CAPSULE, LIQUID FILLED ORAL 2 TIMES DAILY
Qty: 30 CAPSULE | Refills: 1 | Status: SHIPPED | OUTPATIENT
Start: 2022-08-31

## 2022-08-31 RX ADMIN — SODIUM CHLORIDE, SODIUM LACTATE, POTASSIUM CHLORIDE, CALCIUM CHLORIDE: 600; 310; 30; 20 INJECTION, SOLUTION INTRAVENOUS at 08:24:00

## 2022-08-31 RX ADMIN — LIDOCAINE HYDROCHLORIDE 25 MG: 10 INJECTION, SOLUTION EPIDURAL; INFILTRATION; INTRACAUDAL; PERINEURAL at 07:39:00

## 2022-08-31 RX ADMIN — CEFAZOLIN SODIUM/WATER 2 G: 2 G/20 ML SYRINGE (ML) INTRAVENOUS at 07:43:00

## 2022-08-31 RX ADMIN — ROCURONIUM BROMIDE 30 MG: 10 INJECTION, SOLUTION INTRAVENOUS at 07:39:00

## 2022-08-31 RX ADMIN — DEXAMETHASONE SODIUM PHOSPHATE 8 MG: 4 MG/ML VIAL (ML) INJECTION at 07:46:00

## 2022-08-31 RX ADMIN — SODIUM CHLORIDE, SODIUM LACTATE, POTASSIUM CHLORIDE, CALCIUM CHLORIDE: 600; 310; 30; 20 INJECTION, SOLUTION INTRAVENOUS at 10:04:00

## 2022-08-31 RX ADMIN — NEOSTIGMINE METHYLSULFATE 4 MG: 1 INJECTION, SOLUTION INTRAVENOUS at 09:50:00

## 2022-08-31 RX ADMIN — ROCURONIUM BROMIDE 10 MG: 10 INJECTION, SOLUTION INTRAVENOUS at 08:39:00

## 2022-08-31 RX ADMIN — METOCLOPRAMIDE HYDROCHLORIDE 10 MG: 5 INJECTION INTRAMUSCULAR; INTRAVENOUS at 09:47:00

## 2022-08-31 RX ADMIN — ROCURONIUM BROMIDE 10 MG: 10 INJECTION, SOLUTION INTRAVENOUS at 09:09:00

## 2022-08-31 RX ADMIN — GLYCOPYRROLATE 0.8 MG: 0.2 INJECTION, SOLUTION INTRAMUSCULAR; INTRAVENOUS at 09:50:00

## 2022-08-31 RX ADMIN — ONDANSETRON 4 MG: 2 INJECTION INTRAMUSCULAR; INTRAVENOUS at 09:47:00

## 2022-08-31 RX ADMIN — MIDAZOLAM HYDROCHLORIDE 3 MG: 1 INJECTION INTRAMUSCULAR; INTRAVENOUS at 07:29:00

## 2022-08-31 RX ADMIN — SODIUM CHLORIDE, SODIUM LACTATE, POTASSIUM CHLORIDE, CALCIUM CHLORIDE: 600; 310; 30; 20 INJECTION, SOLUTION INTRAVENOUS at 08:34:00

## 2022-08-31 RX ADMIN — LIDOCAINE HYDROCHLORIDE 50 MG: 10 INJECTION, SOLUTION EPIDURAL; INFILTRATION; INTRACAUDAL; PERINEURAL at 09:51:00

## 2022-08-31 NOTE — OPERATIVE REPORT
[facility]    PATIENT'S NAME: RENEE FARNSWORTH   ATTENDING PHYSICIAN: Alex Plunkett M.D. OPERATING PHYSICIAN: Alex Plunkett M.D. PATIENT ACCOUNT#:   [de-identified]    LOCATION:  [facility] 17 EDWP 10  MEDICAL RECORD #:   [id number]       YOB: 1975  ADMISSION DATE:       08/31/2022      OPERATION DATE:  08/31/2022    OPERATIVE REPORT      PREOPERATIVE DIAGNOSIS:  History of bilateral mastectomy, status post tissue expander reconstruction. POSTOPERATIVE DIAGNOSIS:  History of bilateral mastectomy, status post tissue expander reconstruction. PROCEDURE:    1. Removal of bilateral breast tissue expander. 2.   Placement of bilateral breast capsulorrhaphy. 3.   Placement of silicone gel implants, bilateral breasts. 4.   Autologous fat grafting to bilateral reconstructed breast.    ASSISTANT:  KATRINA Kramer. ANESTHESIA:  General.    ESTIMATED BLOOD LOSS:  10 mL. COMPLICATIONS:  None. INDICATIONS:  Patient is a 51-year-old female who previously underwent bilateral nipple-sparing mastectomy and delayed prepectoral tissue expander reconstruction. She developed some areas of necrosis of the left nipple-areolar complex during expansion which required reclosure. She then went on to complete expansion and now presents for exchange of permanent implants and fat grafting. FINDINGS:  Bilateral breasts reconstructed with Francis MemoryGel Xtra smooth, round, silicone implant, 183 mL, model number [id number], on the right serial number [id number], on the left serial number [id number]. OPERATIVE TECHNIQUE:  Informed consent was obtained from the patient. The risks, benefits, and alternatives were reviewed with the patient preoperatively. She expressed understanding and wished to proceed. The patient was marked in the preoperative holding area in the upright position. The midline and inframammary folds were marked. Areas to be fat grafted were marked.   Areas of central abdominal flank lipodystrophy were marked, taking care to stay remote from the patient's umbilical hernia and periumbilical scar. The patient was then taken to the operating room, properly identified, placed in supine position. Sequential compression devices were placed on bilateral lower extremities. Intravenous antibiotic prophylaxis was administered. The patient then underwent successful induction of general anesthesia and endotracheal intubation. The arms were placed abducted on foam-padded armboards and loosely secured with Kerlix. The chest and abdomen were prepped and draped sterilely. The proposed liposuction port sites in the lateral abdomen were infiltrated with 1% lidocaine with epinephrine. Stab incisions were then created and approximately 800 mL of tumescent solution was infiltrated into the central abdomen and flanks, staying remote from the umbilicus. Both inframammary fold scars were then infiltrated with 1% lidocaine with epinephrine. The procedure began on the right breast.  The previous scar was incised with scalpel. A superior subcutaneous flap was then elevated sharply with a scalpel. A transverse capsulotomy was then created, and the tissue expander was encountered and found to be intact. It was punctured, aspirated, and removed. The pocket was inspected. Complete incorporation of the acellular dermal matrix was noted. There were no visible or palpable nodules noted. There was no periprosthetic fluid noted. Sizers were placed. Significant lateral displacement of the implant was noted. As such, a lateral capsulorrhaphy of interrupted and running 2-0 Vicryl suture was performed. Once adequate correction was achieved, attention was turned to the left breast.  In a similar fashion, the left breast scar was incised with a scalpel. A superior subcutaneous flap was then elevated sharply with a scalpel.   A transverse capsulotomy was then created, and the tissue expander was encountered and found to be intact. It was punctured, aspirated, and removed. The pocket was inspected. There was no periprosthetic fluid noted. There was no visible or palpable nodule noted. Complete incorporation of the acellular dermal matrix was noted. Again, sizers were placed. Similar to the right side, lateral displacement of the pocket was noted. As such, a similar lateral capsulorrhaphy of interrupted and running 2-0 Vicryl suture was performed. Once adequate correction was achieved, attention was turned to the abdomen. Using a 3 mm Tulip cannula, hand-assisted liposuction of the central abdomen and flanks was performed staying remote from the patient's umbilical region. The lipoaspirate was decanted and prepared on Telfa. This yielded 35 mL of usable fat. With the patient in the upright position and sizers in place, fat was grafted to the bilateral breasts. Then, 10 mL was grafted to the right breast and 25 mL was grafted to the left breast.  Next, both pockets were irrigated with Betadine irrigation and then antibiotic irrigation until clear. Hemostasis was checked and noted to be adequate. The surgical site was isolated with Ioban and gloves were changed. The implants were brought on the field and immediately bathed in antibiotic irrigation. They were checked for integrity and noted to be intact. The implants were placed in the prepectoral space taking care to maintain their proper orientation. The capsules were closed with running 2-0 Vicryl sutures. The deep dermis was reapproximated with interrupted 3-0 Vicryl deep dermal sutures and running 4-0 Monocryl subcuticular suture. Exofin and Steri-Strips were placed on all of the incisions. Fluff gauze and a surgical bra were placed on the breast.  TopiFoam and an abdominal binder were placed on the abdomen. The patient was awakened, extubated, and taken to the recovery area in stable condition.   There were no operative complications. All needle, sponge, and instrument counts were correct at the end of the procedure. Dictated By Padmaja Hdez M.D.  d: 08/31/2022 10:08:37  t: 08/31/2022 17:59:15  Adriana Jarquin [id number]  [facility]/

## 2022-08-31 NOTE — H&P
No change in Dr. Mohan La H&P from 8/17. We reviewed the plan for removal of bilateral breast tissue expanders, placement of smooth, round, silicone implants, and fat grafting to bilateral reconstructed breasts. We reviewed the risks of surgery including but not limited to bleeding, infection, scarring, delayed wound healing, asymmetry, implant infection or extrusion requiring removal, ALCL, capsular contracture, hypertrophic scarring or keloid, injury to intra-abdominal structures, contour abnormalities, cysts or calcifications requiring biopsy, and need for further surgery. We reviewed the expected postoperative course including possible need for drains, as well as need for activity limitation and compression. Multiple questions were answered the patient's satisfaction. No guarantees as to outcome were offered. The patient expresses understanding and wishes to proceed.

## 2022-08-31 NOTE — BRIEF OP NOTE
Pre-Operative Diagnosis: Absence of breast, bilateral [Z90.13]       Post-Operative Diagnosis: Absence of breast, bilateral      Procedure Performed:   Removal of bilateral breast tissue expanders, placement of bilateral permanent implants, autologous fat grafting to the bilateral breasts    Surgeon(s) and Role:     Ivonne Gruber MD - Primary    Assistant(s):  Surgical Assistant.: Claudia Minor     Surgical Findings: Nl     Specimen: None     Estimated Blood Loss: Blood Output: 10 mL (8/31/2022  9:47 AM)          Keyla Ferrera MD  8/31/2022  9:57 AM

## 2022-08-31 NOTE — ANESTHESIA PROCEDURE NOTES
Airway  Date/Time: 8/31/2022 7:40 AM  Urgency: elective    Airway not difficult    General Information and Staff    Patient location during procedure: OR  Anesthesiologist: Leeann Galeano MD  Performed: anesthesiologist     Indications and Patient Condition  Indications for airway management: anesthesia  Sedation level: deep  Preoxygenated: yes  Patient position: sniffing  MILS maintained throughout  Mask difficulty assessment: 0 - not attempted    Final Airway Details  Final airway type: endotracheal airway      Successful airway: ETT  Cuffed: yes   Successful intubation technique: direct laryngoscopy  Endotracheal tube insertion site: oral  Blade size: #3  ETT size (mm): 7.5    Cormack-Lehane Classification: grade I - full view of glottis  Placement verified by: chest auscultation and capnometry   Cuff volume (mL): 5  Measured from: lips  ETT to lips (cm): 22  Number of attempts at approach: 1

## 2022-09-01 ENCOUNTER — TELEPHONE (OUTPATIENT)
Dept: SURGERY | Facility: CLINIC | Age: 47
End: 2022-09-01

## 2022-09-01 NOTE — TELEPHONE ENCOUNTER
Spoke to patient who returned call. We discussed breast compression, showering and abdominal binder. All questions asked and answered. patient will follow up for RN visit next week. Patient was instructed to call with any questions.

## 2022-09-01 NOTE — TELEPHONE ENCOUNTER
Patient called with dressing change questions. [facility]

## 2022-09-08 ENCOUNTER — NURSE ONLY (OUTPATIENT)
Dept: SURGERY | Facility: CLINIC | Age: 47
End: 2022-09-08
Payer: COMMERCIAL

## 2022-09-08 NOTE — PROGRESS NOTES
Rolly Sanchez is a 41year old female who presents today for a follow-up after removal of bilateral breast tissue expanders, bilateral breast capsulorrhaphy, placement of silicone gel implants and autologous fat grafting to the bilateral breasts on 8/31/22      She denies fever and chills. She denies nausea, vomiting, diarrhea or constipation. Her pain is controlled. Physical Exam     Surgical incisions are clean, dry, and intact. No erythema, no wound drainage. Breasts: Bilateral breast are soft and without erythema, ecchymosis or hematoma. Mild, resolving bruising at the sites of fat transfer. Steri-strips in tact. Abdomen: Fat grafting sites are clean dry and intact, with steri-strips in place. No sign of erythema, ecchymosis or delayed wound healing. There were no vitals filed for this visit. Assessment and Plan     Elizabeth MAX Javier Laynorman is doing well removal of bilateral breast tissue expanders, bilateral breast capsulorrhaphy, placement of silicone gel implants and autologous fat grafting to the bilateral breasts on 8/31/22    Steri strips on the fat grafting sites were removed. A spitting suture on each fat grafting site were trimmed. Neosporin and Band-Aid was placed. Patient was instructed to monitor for redness. Patient will call if additional spitting sutures occur. Bilateral breast steri-strips were removed. The IMF incision is healing well without delayed wound healing, wound drainage or erythema. Steri Strips were replaced. Mild, resolving bruising from fat grafting was noted. Patient discussed mild implant rippling noted. She is aware that she did not have much fat to harvest for transer. She will discuss this with Dr. Abdullahi Grier at her next visit. Patient is taking Norco at night to help with sleep aide. I instructed patient, if pain is managed, to switch to Tylenol PM.    She will follow up with Dr. Abdullahi Grier in 1-2 weeks for post operative scar check. Questions were answered. Patient understands.      Toshia Allan RN  9/8/2022  10:22 AM

## 2022-09-19 NOTE — PROGRESS NOTES
Yusuf Frederick is a 41year old female who presents today for a follow-up. She denies fever and chills. She denies nausea, vomiting, diarrhea or constipation. She complains of rippling of bilateral breast.    Physical Examination:  Breasts: Bilateral breast incisions are clean dry and intact. Moderate rippling is noted in the upper poles bilaterally. There is no erythema or seroma noted. Assessment and Plan:  Patient is doing well. We discussed scar care including massage moisturizer and silicone products. The patient may slowly increase activity with compression in place. Given her body habitus, we discussed the option of conversion to a more cohesive implant in future. The patient will return for follow-up in 3 months. The plan was reviewed with the patient and questions were answered.

## 2022-09-20 ENCOUNTER — OFFICE VISIT (OUTPATIENT)
Dept: SURGERY | Facility: CLINIC | Age: 47
End: 2022-09-20

## 2022-09-20 DIAGNOSIS — Z90.13 ABSENCE OF BREAST, BILATERAL: Primary | ICD-10-CM

## 2022-09-20 PROCEDURE — 99024 POSTOP FOLLOW-UP VISIT: CPT | Performed by: SURGERY

## 2022-09-23 ENCOUNTER — SOCIAL WORK SERVICES (OUTPATIENT)
Dept: HEMATOLOGY/ONCOLOGY | Facility: HOSPITAL | Age: 47
End: 2022-09-23

## 2022-09-23 NOTE — PROGRESS NOTES
wrote return to work letter, sending to patient via [address] Elizabeth NOBLE [facility] (01/27/1975) is a patient under my care at [facility]. She is cleared to return to work on 09/28/2022 with no restrictions.

## 2022-12-06 ENCOUNTER — OFFICE VISIT (OUTPATIENT)
Dept: SURGERY | Facility: CLINIC | Age: 47
End: 2022-12-06

## 2022-12-06 DIAGNOSIS — Z90.13 ABSENCE OF BREAST, BILATERAL: Primary | ICD-10-CM

## 2022-12-06 DIAGNOSIS — L98.8 RHYTIDES: ICD-10-CM

## 2022-12-06 NOTE — PROCEDURES
Patient presents and wishes undergo repeat Botox treatments to her forehead, glabella, and periocular region. The risk, benefits, and alternatives were reviewed with the patient. She expressed understanding wish to proceed. 32 units of Botox (lot [id number], expiration 03/2025) were injected as per the attached diagram.  Aftercare instructions were reviewed with patient. Questions were answered. The patient tolerated procedure well.

## 2022-12-06 NOTE — PROGRESS NOTES
Edward Hernadez is a 41year old female who presents today for a follow-up. She denies fever and chills. She denies nausea, vomiting, diarrhea or constipation. Physical Examination:  Breasts: Bilateral breast incisions are clean dry and intact. Acceptable shape and symmetry is noted. Assessment and Plan:  Patient is doing well. She may slowly resume regular activity with compression in place. We discussed scar care including massage with moisturizer and silicone products. The patient will return for scar check in 6 months. The plan was reviewed the patient and questions were answered.

## 2023-02-23 ENCOUNTER — OFFICE VISIT (OUTPATIENT)
Dept: SURGERY | Facility: CLINIC | Age: 48
End: 2023-02-23
Payer: COMMERCIAL

## 2023-02-23 VITALS
HEIGHT: 63 IN | TEMPERATURE: 99 F | SYSTOLIC BLOOD PRESSURE: 130 MMHG | BODY MASS INDEX: 17.19 KG/M2 | WEIGHT: 97 LBS | RESPIRATION RATE: 16 BRPM | DIASTOLIC BLOOD PRESSURE: 79 MMHG | HEART RATE: 82 BPM | OXYGEN SATURATION: 94 %

## 2023-02-23 DIAGNOSIS — Z90.13 ABSENCE OF BREAST, BILATERAL: Primary | ICD-10-CM

## 2023-02-23 DIAGNOSIS — D05.12 BREAST NEOPLASM, TIS (DCIS), LEFT: ICD-10-CM

## 2023-02-23 PROCEDURE — 3078F DIAST BP <80 MM HG: CPT

## 2023-02-23 PROCEDURE — 99203 OFFICE O/P NEW LOW 30 MIN: CPT

## 2023-02-23 PROCEDURE — 3008F BODY MASS INDEX DOCD: CPT

## 2023-02-23 PROCEDURE — 3075F SYST BP GE 130 - 139MM HG: CPT

## 2023-09-12 ENCOUNTER — OFFICE VISIT (OUTPATIENT)
Dept: SURGERY | Facility: CLINIC | Age: 48
End: 2023-09-12
Payer: COMMERCIAL

## 2023-09-12 DIAGNOSIS — Z90.13 ABSENCE OF BREAST, BILATERAL: Primary | ICD-10-CM

## 2023-09-12 PROCEDURE — 99212 OFFICE O/P EST SF 10 MIN: CPT | Performed by: SURGERY

## 2024-03-22 ENCOUNTER — APPOINTMENT (OUTPATIENT)
Dept: LAB | Age: 49
End: 2024-03-22

## 2024-07-03 ENCOUNTER — OFFICE VISIT (OUTPATIENT)
Dept: SURGERY | Facility: CLINIC | Age: 49
End: 2024-07-03
Payer: COMMERCIAL

## 2024-07-03 VITALS
BODY MASS INDEX: 17 KG/M2 | DIASTOLIC BLOOD PRESSURE: 83 MMHG | SYSTOLIC BLOOD PRESSURE: 121 MMHG | WEIGHT: 98 LBS | TEMPERATURE: 98 F | RESPIRATION RATE: 16 BRPM | HEART RATE: 69 BPM | OXYGEN SATURATION: 97 %

## 2024-07-03 DIAGNOSIS — Z90.13 ABSENCE OF BREAST, BILATERAL: ICD-10-CM

## 2024-07-03 DIAGNOSIS — D05.12 BREAST NEOPLASM, TIS (DCIS), LEFT: Primary | ICD-10-CM

## 2024-07-03 DIAGNOSIS — M79.622 AXILLARY PAIN, LEFT: ICD-10-CM

## 2024-07-03 PROCEDURE — 76642 ULTRASOUND BREAST LIMITED: CPT | Performed by: SURGERY

## 2024-07-03 PROCEDURE — 99213 OFFICE O/P EST LOW 20 MIN: CPT | Performed by: SURGERY

## 2024-07-03 NOTE — PROGRESS NOTES
Breast Surgery Surveillance Visit    Diagnosis: Left breast DCIS status post bilateral nipple sparing mastectomy with left sentinel lymph node biopsy, with wound closure on 4/12/21     Stage: Tis N0 MX    Disease Status:  Surgical treatment complete, no further treatment pending.    History of Present Illness:   Ms. Elizabeth Barker is a 49 year old woman who presents with an imaging detected concern of the left breast.  She denies any palpable masses, nipple discharge, skin changes or axillary symptoms.  She does not have a significant family history for breast cancer.  She has no personal prior history of breast disease or biopsies.  She underwent a screening mammogram and was found to have new calcifications in the left breast for which additional imaging was recommended.  The diagnostic evaluation on February 26, 2021 confirmed a group of calcifications for which biopsy was recommended.  This took place on March 3, 2021 and confirmed left breast DCIS measuring up to 5 mm, ER 20%, NH 0%. She underwent genetic testing, which was negative for mutation. She underwent bilateral mastectomy, which occurred without complication. She has healed well since surgery with no concerns related to bilateral chest wall.  She had her implant exchange in August 2022.  She reports that recently she has had some discomfort to the left axilla.  She does not feel a discrete lump in this area.  She is here today for evaluation and recommendations for further therapy.        Past Medical History:    ADHD (attention deficit hyperactivity disorder)    Anxiety and depression    Anxiety state    Attention deficit hyperactivity disorder (ADHD)    Calculus of kidney    COVID-19    Fever,chills, runny nose and loss of taste. Current asymptomatic. No hospitalization    Ductal carcinoma in situ (DCIS) of left breast    Dysmenorrhea    s/p YOHAN    Kidney stone    Microscopic hematuria    Migraines    Ovarian cyst    Visual impairment    glasses     Vitamin D deficiency       Past Surgical History:   Procedure Laterality Date    Cystourethroscopy Right 2016    Procedure: LITHOTRIPSY WITH CYSTOSCOPY, STENT PLACEMENT;  Surgeon: Michele Adams MD;  Location: [facility]    D & c      elective ab x3    Exc skin benig 1.1-2cm face,facial  1/20/10    Performed by SHARIF SUMNER at [facility]    Fragmenting of kidney stone Right 2016    Procedure: LITHOTRIPSY WITH CYSTOSCOPY, STENT PLACEMENT;  Surgeon: Michele Adams MD;  Location: [facility]    Hysterectomy  2014    pt. still has right ovary    Lap supracerv hysterectomy      Layr clos wnd face,facial <2.5cm  1/20/10    Performed by SHARIF SUMNER at [facility]    Lithotripsy  16    Cysto RT [facility]-Dr Adams    Mastectomy left      Mastectomy right      Other surgical history  2018    left oopherectomy    Tubal ligation  12    with amina ramey - [facility]       Gynecological History:  Pt is a   Pt was 27 years old at time of first pregnancy.    She denies any cumulative breastfeeding history .  She achieved menarche at age 16   Age of Menopause: 39  Type: Hysterectomy with ovaries left in  She denies any history of hormone replacement therapy  .  She denies any history of oral contraceptive use .  She denies infertility treatment to achieve pregnancy.    Medications:     sertraline 100 MG Oral Tab Take 1 tablet (100 mg total) by mouth daily. 30 tablet 1    ALPRAZolam 0.25 MG Oral Tab Take 1 tablet (0.25 mg total) by mouth daily as needed for Anxiety. 30 tablet 1       Allergies:    Allergies   Allergen Reactions    Blue Dyes OTHER (SEE COMMENTS)     Blue dye coating on Prozac capsule. Trouble swallowing and burning sensation.       Family History:   Family History   Problem Relation Age of Onset    Cancer Father 84        PROSTATE    Other (Other) Father     Other (Other) Daughter         ASTHMA    Cancer Maternal Grandmother          KIDNEY    Cancer Maternal Grandfather         LUNG    Heart Disorder Paternal Grandmother         MI    Other (Other) Sister         ASTHMA    Other (cva) Mother 74       She is not of Ashkenazi Rastafarian ancestry.    Social History:  History   Alcohol Use    Yes       History   Smoking Status    Former    Packs/day: 0.50    Years: 20.00    Quit date: 3/15/2021   Smokeless Tobacco    Never   The patient is single.  She has 2 children.  She is employed full-time.    Review of Systems:  General:   The patient denies, fever, chills, night sweats, fatigue, generalized weakness, change in appetite or weight loss.    HEENT:     The patient denies eye irritation, cataracts, redness, glaucoma, yellowing of the eyes, change in vision or color blindness.+Wears contacts/glasses. The patient denies hearing loss, ringing in the ears, ear drainage, earaches, nasal congestion, nose bleeds, snoring, pain in mouth/throat, hoarseness, change in voice, facial trauma.    Respiratory:  The patient denies chronic cough, phlegm, hemoptysis, pleurisy/chest pain, pneumonia, asthma, wheezing, difficulty in breathing with exertion, emphysema, chronic bronchitis, shortness of breath or abnormal sound when breathing.     Cardiovascular:  There is no history of chest pain, chest pressure/discomfort, palpitations, irregular heartbeat, fainting or near-fainting, difficulty breathing when lying flat, SOB/Coughing at night, swelling of the legs or chest pain while walking.    Breasts:  See history of present illness    Gastrointestinal:     There is no history of difficulty or pain with swallowing, reflux symptoms, vomiting, dark or bloody stools, constipation, yellowing of the skin, indigestion, nausea, change in bowel habits, diarrhea, abdominal pain or vomiting blood.     Genitourinary:  The patient denies frequent urination, needing to get up at night to urinate, urinary hesitancy or retaining urine, painful urination, urinary incontinence,  decreased urine stream, blood in the urine or vaginal/penile discharge.    Skin:    The patient denies rash, itching, skin lesions, dry skin, change in skin color or change in moles.     Hematologic/Lymphatic:  The patient denies easily bruising or bleeding or persistent swollen glands or lymph nodes.     Musculoskeletal:  The patient denies muscle aches/pain, joint pain, stiff joints, neck pain, back pain or bone pain.    Neuropsychiatric:  There is no history of migraines or severe headaches, seizure/epilepsy, speech problems, coordination problems, trembling/tremors, fainting/black outs, dizziness, memory problems, loss of sensation/numbness, problems walking, weakness, tingling or burning in hands/feet. There is no history of abusive relationship, bipolar disorder, sleep disturbance, +anxiety, +depression or feeling of despair.    Endocrine:    There is no history of poor/slow wound healing, weight loss/gain, fertility or hormone problems, cold intolerance, thyroid disease.     Allergic/Immunologic:  There is no history of hives, hay fever, angioedema or anaphylaxis.    /83 (BP Location: Right arm, Patient Position: Sitting, Cuff Size: adult)   Pulse 69   Temp 98.3 °F (36.8 °C) (Temporal)   Resp 16   Wt 44.5 kg (98 lb)   LMP 02/11/2014 (LMP Unknown)   SpO2 97%   BMI 17.36 kg/m²     Physical Exam:  The patient is an alert, oriented, well-nourished and  well-developed woman who appears her stated age. Her speech patterns and movements are normal. Her affect is appropriate.    HEENT: The head is normocephalic. The neck is supple. The thyroid is not enlarged and is without palpable masses/nodules. There are no palpable masses. The trachea is in the midline. Conjunctiva are clear, non-icteric.    Chest: The chest expands symmetrically. The lungs are clear to auscultation.    Heart: The rhythm is regular.  There are no murmurs, rubs, gallops or thrills.    Breasts: Breasts are surgically absent.  Intact  implant reconstruction.  There are no palpable chest wall nodules, skin changes or palpable adenopathy bilaterally.  She has reproducible tenderness immediately underlying the left axillary incision.    Abdomen:  The abdomen is soft, flat and non tender. The liver is not enlarged. There are no palpable masses.    Lymph Nodes:  The supraclavicular, axillary and cervical regions are free of significant lymphadenopathy.    Back: There is no vertebral column tenderness.    Skin: The skin appears normal. There are no suspicious appearing rashes or lesions.    Extremities: The extremities are without deformity, cyanosis or edema.    Impression:   Ms. Elizabeth Barker is a 49 year old woman presents with imaging detected left breast DCIS status post bilateral nipple sparing mastectomies with left sentinel lymph node biopsy.    Recommendations:   I had a discussion with the Patient regarding her breast exam.  She is healing well since surgery with no signs of new or recurrent disease.  She has reproducible tenderness to palpation underlying her left axillary incision.  I recommended a targeted ultrasound to ensure there is no pathology in this location.    Targeted left axillary ultrasound  Targeted ultrasound of the left axilla was performed.  Immediately underlying her incision seems she was noted to have a normal-appearing lymph node with a cortical thickness of only 1.6 mm with no suspicious otherwise noted findings.    I reassured the patient there are no clinically or radiologically concerning findings at this time and that no further intervention would be recommended.  She is following with plastic surgery. She will follow up in 1 year for clinical exam. She will no longer need mammograms status post bilateral mastectomies. I encouraged her to continue monitoring her ROM and strength and explained that a referral to physical therapy may be warranted in the future if she identifies any limitations or restrictions. She was  given ample opportunity for questions and those questions were answered to her satisfaction. She was encouraged to contact the office with any questions or concerns prior to her next scheduled appointment.     A total of 25 minutes, more than 50% of which was dedicated to the discussion of management options.

## 2024-09-20 ENCOUNTER — OFFICE VISIT (OUTPATIENT)
Dept: SURGERY | Facility: CLINIC | Age: 49
End: 2024-09-20
Payer: COMMERCIAL

## 2024-09-20 DIAGNOSIS — Z90.13 ABSENCE OF BREAST, BILATERAL: Primary | ICD-10-CM

## 2024-09-20 PROCEDURE — 99212 OFFICE O/P EST SF 10 MIN: CPT | Performed by: SURGERY

## 2024-09-20 NOTE — PROGRESS NOTES
Elizabeth Barker is a 49 year old female who presents today for a yearly follow-up.   She has a history of bilateral mastectomy and reconstruction with [place] MemoryGel Xtra smooth, round, silicone implant, 430 mL in August 2022.  She is without new complaints.  Specifically, she denies any masses, skin changes, or nipple discharge.     Physical Examination:  HEENT: There is no cervical or supraclavicular lymphadenopathy noted.    Breasts: Bilateral breasts are soft without palpable masses.  There is no nipple inversion or nipple discharge noted.  There is no axillary lymphadenopathy noted bilaterally.  There is no erythema or seroma noted.     Assessment and Plan:  Patient is doing well.  We reviewed the recommended FDA surveillance protocol for silicone implants.  We discussed continuing regular self breast examination with a plan for follow-up in 1 year or sooner if any changes are detected.  The plan was reviewed with the patient and questions were answered. She is without new complaints.  Specifically, she denies any masses, skin changes, or nipple discharge.

## 2025-07-31 ENCOUNTER — OFFICE VISIT (OUTPATIENT)
Dept: SURGERY | Facility: CLINIC | Age: 50
End: 2025-07-31
Payer: COMMERCIAL

## 2025-07-31 VITALS
WEIGHT: 103 LBS | RESPIRATION RATE: 16 BRPM | OXYGEN SATURATION: 93 % | BODY MASS INDEX: 18 KG/M2 | SYSTOLIC BLOOD PRESSURE: 124 MMHG | HEART RATE: 90 BPM | DIASTOLIC BLOOD PRESSURE: 81 MMHG

## 2025-07-31 DIAGNOSIS — D05.12 BREAST NEOPLASM, TIS (DCIS), LEFT: ICD-10-CM

## 2025-07-31 DIAGNOSIS — Z90.13 ABSENCE OF BREAST, BILATERAL: Primary | ICD-10-CM

## 2025-07-31 PROCEDURE — 99214 OFFICE O/P EST MOD 30 MIN: CPT

## (undated) DIAGNOSIS — D05.12 DUCTAL CARCINOMA IN SITU (DCIS) OF LEFT BREAST: ICD-10-CM

## (undated) DIAGNOSIS — D05.12 BREAST NEOPLASM, TIS (DCIS), LEFT: ICD-10-CM

## (undated) DIAGNOSIS — Z90.13 ABSENCE OF BREAST, BILATERAL: Primary | ICD-10-CM

## (undated) DEVICE — HIGH PROFILE XTRA, GEL SIZER FOR USE WITH SHPX-450: Brand: MENTOR MEMORYGEL XTRA RESTERILIZABLE GEL SIZER

## (undated) DEVICE — 3M(TM) TEGADERM(TM) TRANSPARENT FILM DRESSING FRAME STYLE 9505W: Brand: 3M™ TEGADERM™

## (undated) DEVICE — SCRUB PVP -1 PREP SOLUTION 4OZ

## (undated) DEVICE — #15 STERILE STAINLESS BLADE: Brand: STERILE STAINLESS BLADES

## (undated) DEVICE — DRESSING FOAM TOPIFOAM

## (undated) DEVICE — STERILE POLYISOPRENE POWDER-FREE SURGICAL GLOVES: Brand: PROTEXIS

## (undated) DEVICE — Device

## (undated) DEVICE — VIOLET BRAIDED (POLYGLACTIN 910), SYNTHETIC ABSORBABLE SUTURE: Brand: COATED VICRYL

## (undated) DEVICE — SYRINGE 5ML LL TIP

## (undated) DEVICE — SYRINGE CATH TIP 50ML

## (undated) DEVICE — 3M™ IOBAN™ 2 ANTIMICROBIAL INCISE DRAPE 6648EZ: Brand: IOBAN™ 2

## (undated) DEVICE — PLASTIC BREAST CDS-LF: Brand: MEDLINE INDUSTRIES, INC.

## (undated) DEVICE — SUTURE SILK 2-0 FS

## (undated) DEVICE — 1010 S-DRAPE TOWEL DRAPE 10/BX: Brand: STERI-DRAPE™

## (undated) DEVICE — DRESSING BIOPATCH 1X4 CNTR

## (undated) DEVICE — BREAST-HERNIA-PORT CDS-LF: Brand: MEDLINE INDUSTRIES, INC.

## (undated) DEVICE — SOLUTION  .9 1000ML BTL

## (undated) DEVICE — 3M™ IOBAN™ 2 ANTIMICROBIAL INCISE DRAPE 6651EZ: Brand: IOBAN™ 2

## (undated) DEVICE — SOL  .9 1000ML BTL

## (undated) DEVICE — SUPER SPONGES,MEDIUM: Brand: KERLIX

## (undated) DEVICE — SUTURE CHROMIC GUT 5-0 P-3

## (undated) DEVICE — SUTURE MONOCRYL 4-0 PS-2

## (undated) DEVICE — HEMOCLIP HORIZON MED 002200

## (undated) DEVICE — HEMOCLIP HORIZON SM 001200

## (undated) DEVICE — 40580 - THE PINK PAD - ADVANCED TRENDELENBURG POSITIONING KIT: Brand: 40580 - THE PINK PAD - ADVANCED TRENDELENBURG POSITIONING KIT

## (undated) DEVICE — 1910 FOAM BLOCK NEEDLE COUNTER: Brand: DEVON

## (undated) DEVICE — DRAIN RELIAVAC 100CC

## (undated) DEVICE — 3M(TM) TEGADERM(TM) TRANSPARENT FILM DRESSING FRAME STYLE 1628: Brand: 3M™ TEGADERM™

## (undated) DEVICE — PROXIMATE RH ROTATING HEAD SKIN STAPLERS (35 WIDE) CONTAINS 35 STAINLESS STEEL STAPLES: Brand: PROXIMATE

## (undated) DEVICE — MARKER SKIN PREP RESIST STRL

## (undated) DEVICE — SUTURE VICRYL 3-0 SH

## (undated) DEVICE — GAUZE SPONGES,8 PLY: Brand: CURITY

## (undated) DEVICE — LAPAROTOMY SPONGE - RF AND X-RAY DETECTABLE PRE-WASHED: Brand: SITUATE

## (undated) DEVICE — CG INFILTRATION TUBING: Brand: CG INFILTRATION TUBING

## (undated) DEVICE — SYRINGE 10ML LL TIP

## (undated) DEVICE — SUT VICRYL 3-0 SH J416H

## (undated) DEVICE — 3M™ STERI-STRIP™ REINFORCED ADHESIVE SKIN CLOSURES, R1548, 1 IN X 5 IN (25 MM X 125 MM), 4 STRIPS/ENVELOPE: Brand: 3M™ STERI-STRIP™

## (undated) DEVICE — SYRINGE 50ML LL TIP

## (undated) DEVICE — SCD SLEEVE KNEE HI BLEND

## (undated) DEVICE — STANDARD HYPODERMIC NEEDLE,POLYPROPYLENE HUB: Brand: MONOJECT

## (undated) DEVICE — 450 ML BOTTLE OF 0.05% CHLORHEXIDINE GLUCONATE IN 99.95% STERILE WATER FOR IRRIGATION, USP AND APPLICATOR.: Brand: IRRISEPT ANTIMICROBIAL WOUND LAVAGE

## (undated) DEVICE — CHLORAPREP ORANGE TINT 10.5ML

## (undated) DEVICE — MARKER SKIN 2 TIP

## (undated) DEVICE — SLEEVE KENDALL SCD EXPRESS MED

## (undated) DEVICE — SUTURE PDS II 2-0 CT-2

## (undated) DEVICE — OCCLUSIVE GAUZE STRIP OVERWRAP,3% BISMUTH TRIBROMOPHENATE IN PETROLATUM BLEND: Brand: XEROFORM

## (undated) DEVICE — KENDALL SCD EXPRESS SLEEVES, KNEE LENGTH, MEDIUM: Brand: KENDALL SCD

## (undated) DEVICE — SUTURE ETHILON 3-0 FS-1

## (undated) DEVICE — GAUZE SPONGES,12 PLY: Brand: CURITY

## (undated) DEVICE — TOWEL SURG OR 17X30IN BLUE

## (undated) DEVICE — BINDER ABDOMINAL 0-45IN 3PANEL

## (undated) DEVICE — UNDERPAD 23X36 LIGHT ASBORB

## (undated) DEVICE — LIGHT HANDLE

## (undated) DEVICE — DRAIN ROUND HUBLESS 15FR

## (undated) DEVICE — SUTURE PROLENE 5-0 RB-1

## (undated) DEVICE — STOPCOCK IV 4 WAY BD

## (undated) DEVICE — CAUTERY BLADE 2IN INS E1455

## (undated) DEVICE — BLADE ELECTROSURG 4IN INSULATE

## (undated) DEVICE — 60 ML SYRINGE,TOOMEY TYPE: Brand: MONOJECT

## (undated) DEVICE — NON-ADHERENT PAD PREPACK: Brand: TELFA

## (undated) DEVICE — PROVE COVER: Brand: UNBRANDED

## (undated) DEVICE — 3M™ STERI-DRAPE™ INSTRUMENT POUCH 1018: Brand: STERI-DRAPE™

## (undated) DEVICE — EXOFIN TISSUE ADHESIVE 1.0ML

## (undated) DEVICE — MEGADYNE E-Z CLEAN BLADE 2.75"

## (undated) DEVICE — CLOSURE EXOFIN 1.0ML

## (undated) DEVICE — PEN SKIN MARKING REG TIP VIOLT

## (undated) DEVICE — CLEAR MONOFILAMENT (POLYDIOXANONE), ABSORBABLE SURGICAL SUTURE: Brand: PDS

## (undated) DEVICE — SUT MONOCRYL 4-0 PS-2 Y426H

## (undated) NOTE — LETTER
[facility]  [address] [facility], [facility] [address]:  [phone]  FAX:  [phone]    21    Patient: Kelsey Mackey  : 1975     Elizabeth is under ou

## (undated) NOTE — LETTER
Patient Name: Evon Wise  YOB: 1975          MRN :  [id number]  Date:  8/26/2021  Referring Physician:  Shanna Herrera    Discharge Summary  Pt has attended 12/12 visits in Occupational Therapy from 7/14-8/26/21.       Subjective:   Pt reports d Therapy x2/week for soft tissue mobilization of hypomobile tissue in Left axilla, upper arm and chest wall as well as of LND scar; Left upper quadrant manual lymph drainage; instruction in skin care, lymphedema risk reduction, self-manual lymph drainage; p reduction practices. 4.  Contact this therapist/department for any questions/concerns. Goals:  (to be met in 12 visits)  1. Pt will be independent in tissue tension release and home exercises. ACHIEVED   2.  Pt will tolerate Left UE light compressi

## (undated) NOTE — LETTER
Patient Name: Gladys Bernard  YOB: 1975          MRN :  [id number]  Date:  8/12/2021  Referring Physician:  Karthik Oates    Progress Summary  Pt has attended 9/12 visits in Occupational Therapy.       Subjective:   Pt reports she added wall stretc abnormal cordlike structures palpated in Left axilla with extension into medial surface of proximal 1/2 of upper arm. Slightly hypomobile LND scar tissue.   AROM: Left shoulder active flex has improved by 13 degrees to 150; abd improved by 43 degrees to 155

## (undated) NOTE — Clinical Note
Date: 1/31/2017    Patient Name: Gilford Duet          To Whom it may concern: This letter has been written at the patient's request. The above patient was seen at the [facility] for treatment of a medical condition.     This patient should b

## (undated) NOTE — LETTER
Roma Perera [address] [facility] [address], [address] [facility]  Authorization for Surgical Operation and Procedure   Date:___________                                                                                            Time:__________  1. I hereby Sharon Dodd MD, my physician and his/her assistants (if applicable), which may include medical students, residents, and/or fellows, to perform the following surgical operation/ procedure and administer such anesthesia as may be determined necessary by my physician:  Operation/Procedure name (s) Left nipple debridement  on Elizabeth SANTANA Barker   2. I recognize that during the surgical operation/procedure, unforeseen conditions may necessitate additional or different procedures than those listed above. I, therefore, further authorize and request that the above-named surgeon, assistants, or designees perform such procedures as are, in their judgment, necessary and desirable. 3.   My surgeon/physician has discussed prior to my surgery the potential benefits, risks and side effects of this procedure; the likelihood of achieving goals; and potential problems that might occur during recuperation. They also discussed reasonable alternatives to the procedure, including risks, benefits, and side effects related to the alternatives and risks related to not receiving this procedure. I have had all my questions answered and I acknowledge that no guarantee has been made as to the result that may be obtained. 4.   Should the need arise during my operation or immediate post-operative period, I also consent to the administration of blood and/or blood products. Further, I understand that despite careful testing and screening of blood or blood products by collecting agencies, I may still be subject to ill effects as a result of receiving a blood transfusion and/or blood products.   The following are some, but not all, of the potential risks that can occur: fever and allergic reactions, hemolytic reactions, transmission of diseases such as Hepatitis, AIDS and Cytomegalovirus (CMV) and fluid overload. In the event that I wish to have an autologous transfusion of my own blood, or a directed donor transfusion. I will discuss this with my physician. 5.   I authorize the use of any specimen, organs, tissues, body parts or foreign objects that may be removed from my body during the operation/procedure for diagnosis, research or teaching purposes and their subsequent disposal by hospital authorities. I also authorize the release of specimen test results and/or written reports to my treating physician on the hospital medical staff or other referring or consulting physicians involved in my care, at the discretion of the Pathologist or my treating physician. 6.   I consent to the photographing or videotaping of the operations or procedures to be performed, including appropriate portions of my body for medical, scientific, or educational purposes, provided my identity is not revealed by the pictures or by descriptive texts accompanying them. If the procedure has been photographed/videotaped, the surgeon will obtain the original picture, image, videotape or CD. The hospital will not be responsible for storage, release or maintenance of the picture, image, tape or CD.    7.   I consent to the presence of a  or observers in the operating room as deemed necessary by my physician or their designees. 8.   I recognize that in the event my procedure results in extended X-Ray/fluoroscopy time, I may develop a skin reaction. 9. If I have a Do Not Attempt Resuscitation (DNAR) order in place, that status will be suspended while in the operating room, procedural suite, and during the recovery period unless otherwise explicitly stated by me (or a person authorized to consent on my behalf).  The surgeon or my attending physician will determine when the applicable recovery period ends for purposes of reinstating the DNAR order. 10. Patients having a sterilization procedure: I understand that if the procedure is successful the results will be permanent and it will therefore be impossible for me to inseminate, conceive, or bear children. I also understand that the procedure is intended to result in sterility, although the result has not been guaranteed. 11. I acknowledge that my physician has explained sedation/analgesia administration to me including the risk and benefits I consent to the administration of sedation/analgesia as may be necessary or desirable in the judgment of my physician.     I CERTIFY THAT I HAVE READ AND FULLY UNDERSTAND THE ABOVE CONSENT TO OPERATION and/or OTHER PROCEDURE.      _________________________________________  __________________________________  Signature of Patient     Signature of Responsible Person         ___________________________________         Printed Name of Responsible Person           _________________________________                 Relationship to Patient  _________________________________________  ______________________________  Signature of Witness          Date  Time          Patient Name: Edward Hernadez     : 1975                 Printed: 2022     Medical Record #: [id number]                                            Page 1 of 1

## (undated) NOTE — MR AVS SNAPSHOT
[facility] [address] [place] [address]  [address] [facility] [id number] [phone]               Thank you for choosing us for your health care visit with MENA Burleson. We are glad to serve you and happy to provide you with this summary of your visit. clots.) Aspirin should never be given to anyone younger than 25years of age who is ill with a viral infection or fever. It may cause severe liver or brain damage. · Your appetite may be poor, so a light diet is fine.  Avoid dehydration by drinking 6 to 8 professional's instructions.              Your Appointments     Jan 31, 2017  1:00 PM   [facility]-VISIT with MENA Alba   [facility] [place] [address] ([facility] [address] [place] [address])    [address] [facility]   [phone]            Feb 02, 201 Bring a paper prescription for each of these medications    - guaiFENesin-codeine 100-10 MG/5ML Soln            MyChart     Visit MyChart  You can access your MyChart to more actively manage your health care and view more details from this visit by going

## (undated) NOTE — LETTER
[facility]  [address] [place] [id number]  Joc[address]: [phone]  FAX: [phone]    Medical Clearance Request    Haley Miranda MD  [address] [place] [address]  Via Fax: [phone]    The patient listed below is scheduled for surgery and needs the following pre-op labs and/or clearance prior to surgery. The patient has been instructed to schedule an appointment with you for a pre-op physical.      Patient: Caryle Spalding  : 1975    Surgeon:  Dr. Marely Jeong    Procedure: Removal of bilateral breast tissue expanders, placement of permanent implants, autologous fat grafting to the bilateral breasts    Surgery Date:  2022  Location:  [facility]    outpatient    [] Hematocrit/Hemogram [x] EKG     [x] CBC    [] Chest X-Ray    [x] CMP    [] PT/PTT    [] Urinalysis   [] MRSA Nasal Culture    [] Urinalysis with reflex  [x] History and Physical    [] Urine pregnancy  [x] Medical Clearance    [] Qualitative HCG (blood) [] Cardiac Clearance      Please fax to [phone] when completed. Call [phone] with any questions. Thank you for your prompt attention to these requirements.     [facility]